# Patient Record
Sex: FEMALE | Race: BLACK OR AFRICAN AMERICAN
[De-identification: names, ages, dates, MRNs, and addresses within clinical notes are randomized per-mention and may not be internally consistent; named-entity substitution may affect disease eponyms.]

---

## 2018-08-21 ENCOUNTER — HOSPITAL ENCOUNTER (INPATIENT)
Dept: HOSPITAL 72 - EMR | Age: 28
LOS: 4 days | Discharge: HOME | DRG: 392 | End: 2018-08-25
Payer: SELF-PAY

## 2018-08-21 VITALS — WEIGHT: 129 LBS | HEIGHT: 61 IN | BODY MASS INDEX: 24.35 KG/M2

## 2018-08-21 VITALS — SYSTOLIC BLOOD PRESSURE: 147 MMHG | DIASTOLIC BLOOD PRESSURE: 105 MMHG

## 2018-08-21 VITALS — DIASTOLIC BLOOD PRESSURE: 89 MMHG | SYSTOLIC BLOOD PRESSURE: 133 MMHG

## 2018-08-21 VITALS — SYSTOLIC BLOOD PRESSURE: 142 MMHG | DIASTOLIC BLOOD PRESSURE: 80 MMHG

## 2018-08-21 DIAGNOSIS — K56.1: ICD-10-CM

## 2018-08-21 DIAGNOSIS — R79.89: ICD-10-CM

## 2018-08-21 DIAGNOSIS — E87.6: ICD-10-CM

## 2018-08-21 DIAGNOSIS — K58.9: ICD-10-CM

## 2018-08-21 DIAGNOSIS — R10.9: Primary | ICD-10-CM

## 2018-08-21 DIAGNOSIS — R74.0: ICD-10-CM

## 2018-08-21 DIAGNOSIS — K59.00: ICD-10-CM

## 2018-08-21 LAB
ADD MANUAL DIFF: NO
ALBUMIN SERPL-MCNC: 4 G/DL (ref 3.4–5)
ALBUMIN/GLOB SERPL: 0.8 {RATIO} (ref 1–2.7)
ALP SERPL-CCNC: 69 U/L (ref 46–116)
ALT SERPL-CCNC: 533 U/L (ref 12–78)
ANION GAP SERPL CALC-SCNC: 15 MMOL/L (ref 5–15)
APPEARANCE UR: (no result)
APTT PPP: (no result) S
AST SERPL-CCNC: 240 U/L (ref 15–37)
BASOPHILS NFR BLD AUTO: 0.8 % (ref 0–2)
BILIRUB DIRECT SERPL-MCNC: 0.3 MG/DL (ref 0–0.3)
BILIRUB SERPL-MCNC: 1.1 MG/DL (ref 0.2–1)
BUN SERPL-MCNC: 11 MG/DL (ref 7–18)
CALCIUM SERPL-MCNC: 9.2 MG/DL (ref 8.5–10.1)
CHLORIDE SERPL-SCNC: 95 MMOL/L (ref 98–107)
CO2 SERPL-SCNC: 23 MMOL/L (ref 21–32)
CREAT SERPL-MCNC: 0.8 MG/DL (ref 0.55–1.3)
EOSINOPHIL NFR BLD AUTO: 0.7 % (ref 0–3)
ERYTHROCYTE [DISTWIDTH] IN BLOOD BY AUTOMATED COUNT: 10.4 % (ref 11.6–14.8)
GLOBULIN SER-MCNC: 4.8 G/DL
GLUCOSE UR STRIP-MCNC: NEGATIVE MG/DL
HCT VFR BLD CALC: 45.3 % (ref 37–47)
HGB BLD-MCNC: 15.7 G/DL (ref 12–16)
KETONES UR QL STRIP: (no result)
LEUKOCYTE ESTERASE UR QL STRIP: (no result)
LYMPHOCYTES NFR BLD AUTO: 23.3 % (ref 20–45)
MCV RBC AUTO: 91 FL (ref 80–99)
MONOCYTES NFR BLD AUTO: 8.6 % (ref 1–10)
NEUTROPHILS NFR BLD AUTO: 66.6 % (ref 45–75)
NITRITE UR QL STRIP: NEGATIVE
PH UR STRIP: 6.5 [PH] (ref 4.5–8)
PLATELET # BLD: 198 K/UL (ref 150–450)
POTASSIUM SERPL-SCNC: 2.6 MMOL/L (ref 3.5–5.1)
PROT UR QL STRIP: (no result)
RBC # BLD AUTO: 4.97 M/UL (ref 4.2–5.4)
SODIUM SERPL-SCNC: 133 MMOL/L (ref 136–145)
SP GR UR STRIP: 1.01 (ref 1–1.03)
UROBILINOGEN UR-MCNC: 12 MG/DL (ref 0–1)
WBC # BLD AUTO: 12.2 K/UL (ref 4.8–10.8)

## 2018-08-21 PROCEDURE — 85025 COMPLETE CBC W/AUTO DIFF WBC: CPT

## 2018-08-21 PROCEDURE — 99285 EMERGENCY DEPT VISIT HI MDM: CPT

## 2018-08-21 PROCEDURE — 86709 HEPATITIS A IGM ANTIBODY: CPT

## 2018-08-21 PROCEDURE — 84100 ASSAY OF PHOSPHORUS: CPT

## 2018-08-21 PROCEDURE — 87340 HEPATITIS B SURFACE AG IA: CPT

## 2018-08-21 PROCEDURE — 84443 ASSAY THYROID STIM HORMONE: CPT

## 2018-08-21 PROCEDURE — 86140 C-REACTIVE PROTEIN: CPT

## 2018-08-21 PROCEDURE — 80053 COMPREHEN METABOLIC PANEL: CPT

## 2018-08-21 PROCEDURE — 85007 BL SMEAR W/DIFF WBC COUNT: CPT

## 2018-08-21 PROCEDURE — 74177 CT ABD & PELVIS W/CONTRAST: CPT

## 2018-08-21 PROCEDURE — 74018 RADEX ABDOMEN 1 VIEW: CPT

## 2018-08-21 PROCEDURE — 94640 AIRWAY INHALATION TREATMENT: CPT

## 2018-08-21 PROCEDURE — 84550 ASSAY OF BLOOD/URIC ACID: CPT

## 2018-08-21 PROCEDURE — 82550 ASSAY OF CK (CPK): CPT

## 2018-08-21 PROCEDURE — 94664 DEMO&/EVAL PT USE INHALER: CPT

## 2018-08-21 PROCEDURE — 80061 LIPID PANEL: CPT

## 2018-08-21 PROCEDURE — 84702 CHORIONIC GONADOTROPIN TEST: CPT

## 2018-08-21 PROCEDURE — 81003 URINALYSIS AUTO W/O SCOPE: CPT

## 2018-08-21 PROCEDURE — 36415 COLL VENOUS BLD VENIPUNCTURE: CPT

## 2018-08-21 PROCEDURE — 80048 BASIC METABOLIC PNL TOTAL CA: CPT

## 2018-08-21 PROCEDURE — 82977 ASSAY OF GGT: CPT

## 2018-08-21 PROCEDURE — 74250 X-RAY XM SM INT 1CNTRST STD: CPT

## 2018-08-21 PROCEDURE — 86705 HEP B CORE ANTIBODY IGM: CPT

## 2018-08-21 PROCEDURE — 86803 HEPATITIS C AB TEST: CPT

## 2018-08-21 PROCEDURE — 83690 ASSAY OF LIPASE: CPT

## 2018-08-21 PROCEDURE — 83735 ASSAY OF MAGNESIUM: CPT

## 2018-08-21 PROCEDURE — 82248 BILIRUBIN DIRECT: CPT

## 2018-08-21 PROCEDURE — 81025 URINE PREGNANCY TEST: CPT

## 2018-08-21 NOTE — EMERGENCY ROOM REPORT
History of Present Illness


General


Chief Complaint:  Abdominal Pain


Source:  Patient





Present Illness


HPI


28-year-old female history of cholecystectomy p/w abdominal pain and vomiting 

for 5 days. 


Patient states pain started gradually, localized to all over abdomen, non 

radiating, sharp in nature, intermittent. No relieving or exacerbating factors. 

Severity is 5 out of 10.


Pt reports n/v, multiple episodes of nbnb vomiting, denies diarrhea, and cannot 

room of the last time she passed gas or stool, probably in 5 days


Denies fever, chills. 


Denies any drug use


Allergies:  


Coded Allergies:  


     No Known Allergies (Unverified , 8/21/18)





Patient History


Past Medical History:  see triage record


Past Surgical History:  none


Pertinent Family History:  none


Last Menstrual Period:  7/14/18


Pregnant Now:  No


Reviewed Nursing Documentation:  PMH: Agreed; PSxH: Agreed





Nursing Documentation-PMH


Past Medical History:  No History, Except For





Review of Systems


All Other Systems:  negative except mentioned in HPI





Physical Exam





Vital Signs








  Date Time  Temp Pulse Resp B/P (MAP) Pulse Ox O2 Delivery O2 Flow Rate FiO2


 


8/21/18 17:26 98.7 116 18 131/69 95 Room Air  





 98.8       








Sp02 EP Interpretation:  reviewed, normal


General Appearance:  alert, GCS 15, non-toxic, moderate distress


Head:  normocephalic, atraumatic


Eyes:  bilateral eye normal inspection, bilateral eye PERRL, bilateral eye EOMI


ENT:  normal ENT inspection, normal pharynx, normal voice, moist mucus membranes


Neck:  normal inspection, full range of motion, supple


Respiratory:  normal inspection, lungs clear, normal breath sounds, no 

respiratory distress, no retraction, no wheezing, speaking full sentences, 

chest symmetrical


Cardiovascular #1:  normal inspection, regular rate, rhythm, no edema, normal 

capillary refill


Cardiovascular #2:  2+ radial (R), 2+ radial (L)


Gastrointestinal:  other - Nondistended, generalized tenderness, normal bowel 

sounds, no rebound no guarding


Musculoskeletal:  normal inspection, back normal, normal range of motion, non-

tender


Neurologic:  normal inspection, alert, oriented x3, responsive, motor strength/

tone normal, sensory intact, normal gait, speech normal


Psychiatric:  normal inspection, judgement/insight normal, memory normal


Skin:  normal inspection, normal color, no rash, warm/dry, well hydrated, 

normal turgor





Medical Decision Making


Diagnostic Impression:  


 Primary Impression:  


 Abdominal pain


 Additional Impressions:  


 Elevated liver enzymes


 Hypokalemia


 Intractable nausea and vomiting


ER Course


28-year-old female with abdominal pain also not passing gas or stool for 5 days





Differential Diagnosis:


Gastritis, gastroenteritis, appendicitis, diverticulitis, SBO, UTI/pyelo








Plan:


Basic labs, ua, pain control, IVF


CT abdopelvis








ER course:


Patient has remained stable during ED stay.


given potassium and fluid, still unable to tolerate PO


repeat abd exam has been benign


CT showing "transient" intussuception - I informed Dr La





Disposition:


Patient is to be admitted to Dr Sanchez





Please note that this Emergency Department Report was dictated using [employer] technology software, occasionally this can lead to 

erroneous entry secondary to interpretation by the dictation equipment





Laboratory Tests








Test


  8/21/18


17:13


 


White Blood Count


  12.2 K/UL


(4.8-10.8)  H


 


Red Blood Count


  4.97 M/UL


(4.20-5.40)


 


Hemoglobin


  15.7 G/DL


(12.0-16.0)


 


Hematocrit


  45.3 %


(37.0-47.0)


 


Mean Corpuscular Volume 91 FL (80-99)  


 


Mean Corpuscular Hemoglobin


  31.5 PG


(27.0-31.0)  H


 


Mean Corpuscular Hemoglobin


Concent 34.6 G/DL


(32.0-36.0)


 


Red Cell Distribution Width


  10.4 %


(11.6-14.8)  L


 


Platelet Count


  198 K/UL


(150-450)


 


Mean Platelet Volume


  8.7 FL


(6.5-10.1)


 


Neutrophils (%) (Auto)


  66.6 %


(45.0-75.0)


 


Lymphocytes (%) (Auto)


  23.3 %


(20.0-45.0)


 


Monocytes (%) (Auto)


  8.6 %


(1.0-10.0)


 


Eosinophils (%) (Auto)


  0.7 %


(0.0-3.0)


 


Basophils (%) (Auto)


  0.8 %


(0.0-2.0)


 


Urine Color Brown  


 


Urine Appearance


  Slightly


cloudy


 


Urine pH 6.5 (4.5-8.0)  


 


Urine Specific Gravity


  1.015


(1.005-1.035)


 


Urine Protein


  2+ (NEGATIVE)


H


 


Urine Glucose (UA)


  Negative


(NEGATIVE)


 


Urine Ketones


  3+ (NEGATIVE)


H


 


Urine Occult Blood


  1+ (NEGATIVE)


H


 


Urine Nitrite


  Negative


(NEGATIVE)


 


Urine Bilirubin


  1+ (NEGATIVE)


H


 


Urine Ictotest


  Negative


(NEGATIVE)


 


Urine Urobilinogen


  12 MG/DL


(0.0-1.0)  H


 


Urine Leukocyte Esterase


  2+ (NEGATIVE)


H


 


Urine RBC


  2-4 /HPF (0 -


2)  H


 


Urine WBC


  5-10 /HPF (0 -


2)  H


 


Urine Squamous Epithelial


Cells Few /LPF


(NONE/OCC)


 


Urine Amorphous Sediment


  Few /LPF


(NONE)  H


 


Urine Bacteria


  Few /HPF


(NONE)


 


Urine HCG, Qualitative


  Negative


(NEGATIVE)


 


Sodium Level


  133 MMOL/L


(136-145)  L


 


Potassium Level


  2.6 MMOL/L


(3.5-5.1)  *L


 


Chloride Level


  95 MMOL/L


()  L


 


Carbon Dioxide Level


  23 MMOL/L


(21-32)


 


Anion Gap


  15 mmol/L


(5-15)


 


Blood Urea Nitrogen


  11 mg/dL


(7-18)


 


Creatinine


  0.8 MG/DL


(0.55-1.30)


 


Estimate Glomerular


Filtration Rate > 60 mL/min


(>60)


 


Glucose Level


  104 MG/DL


()


 


Calcium Level


  9.2 MG/DL


(8.5-10.1)


 


Total Bilirubin


  1.1 MG/DL


(0.2-1.0)  H


 


Direct Bilirubin


  0.3 MG/DL


(0.0-0.3)


 


Aspartate Amino Transferase


(AST) 240 U/L


(15-37)  H


 


Alanine Aminotransferase (ALT)


  533 U/L


(12-78)  H


 


Alkaline Phosphatase


  69 U/L


()


 


Total Protein


  8.8 G/DL


(6.4-8.2)  H


 


Albumin


  4.0 G/DL


(3.4-5.0)


 


Globulin 4.8 g/dL  


 


Albumin/Globulin Ratio


  0.8 (1.0-2.7)


L


 


Lipase


  189 U/L


()


 


Human Chorionic Gonadotropin,


Quant < 1 mIU/mL


(1-6)  L








CT/MRI/US Diagnostic Results


CT/MRI/US Diagnostic Results :  


   Imaging Test Ordered:  CT ABDO PELVIS


   Impression


CT ABDOMEN & PELVIS With Contrast:





Lower thorax is unremarkable.





Gallbladder is surgically absent.





Liver, spleen, pancreas and adrenal glands are unremarkable.





Kidneys, ureters and urinary bladder are unremarkable.





Uterus and adnexa are unremarkable.





Appendix is unremarkable.





Transient small bowel intussusception within the left lower quadrant.





No bowel obstruction.





No acute osseous abnormality.





Last Vital Signs








  Date Time  Temp Pulse Resp B/P (MAP) Pulse Ox O2 Delivery O2 Flow Rate FiO2


 


8/21/18 17:26 98.7 116 18 131/69 95 Room Air  





 98.8       








Disposition:  ADMITTED AS INPATIENT


Condition:  Serious











Natalie Yost M.D. Aug 21, 2018 18:23

## 2018-08-22 VITALS — SYSTOLIC BLOOD PRESSURE: 146 MMHG | DIASTOLIC BLOOD PRESSURE: 97 MMHG

## 2018-08-22 VITALS — DIASTOLIC BLOOD PRESSURE: 105 MMHG | SYSTOLIC BLOOD PRESSURE: 148 MMHG

## 2018-08-22 VITALS — SYSTOLIC BLOOD PRESSURE: 160 MMHG | DIASTOLIC BLOOD PRESSURE: 86 MMHG

## 2018-08-22 VITALS — SYSTOLIC BLOOD PRESSURE: 142 MMHG | DIASTOLIC BLOOD PRESSURE: 92 MMHG

## 2018-08-22 VITALS — DIASTOLIC BLOOD PRESSURE: 86 MMHG | SYSTOLIC BLOOD PRESSURE: 133 MMHG

## 2018-08-22 VITALS — SYSTOLIC BLOOD PRESSURE: 136 MMHG | DIASTOLIC BLOOD PRESSURE: 93 MMHG

## 2018-08-22 LAB
ADD MANUAL DIFF: NO
ALBUMIN SERPL-MCNC: 3.3 G/DL (ref 3.4–5)
ALBUMIN/GLOB SERPL: 0.8 {RATIO} (ref 1–2.7)
ALP SERPL-CCNC: 53 U/L (ref 46–116)
ALT SERPL-CCNC: 471 U/L (ref 12–78)
ANION GAP SERPL CALC-SCNC: 10 MMOL/L (ref 5–15)
AST SERPL-CCNC: 190 U/L (ref 15–37)
BASOPHILS NFR BLD AUTO: 0.9 % (ref 0–2)
BILIRUB SERPL-MCNC: 0.8 MG/DL (ref 0.2–1)
BUN SERPL-MCNC: 6 MG/DL (ref 7–18)
CALCIUM SERPL-MCNC: 7.9 MG/DL (ref 8.5–10.1)
CHLORIDE SERPL-SCNC: 102 MMOL/L (ref 98–107)
CHOLEST SERPL-MCNC: 95 MG/DL (ref ?–200)
CK SERPL-CCNC: 569 U/L (ref 26–308)
CO2 SERPL-SCNC: 22 MMOL/L (ref 21–32)
CREAT SERPL-MCNC: 0.6 MG/DL (ref 0.55–1.3)
EOSINOPHIL NFR BLD AUTO: 1.6 % (ref 0–3)
ERYTHROCYTE [DISTWIDTH] IN BLOOD BY AUTOMATED COUNT: 10.5 % (ref 11.6–14.8)
GAMMA GLUTAMYL TRANSPEPTIDASE: 59 U/L (ref 5–85)
GLOBULIN SER-MCNC: 3.9 G/DL
HCT VFR BLD CALC: 39 % (ref 37–47)
HDLC SERPL-MCNC: 42 MG/DL (ref 40–60)
HGB BLD-MCNC: 13.6 G/DL (ref 12–16)
LYMPHOCYTES NFR BLD AUTO: 23.9 % (ref 20–45)
MCV RBC AUTO: 91 FL (ref 80–99)
MONOCYTES NFR BLD AUTO: 10.9 % (ref 1–10)
NEUTROPHILS NFR BLD AUTO: 62.7 % (ref 45–75)
PHOSPHATE SERPL-MCNC: 2.2 MG/DL (ref 2.5–4.9)
PLATELET # BLD: 187 K/UL (ref 150–450)
POTASSIUM SERPL-SCNC: 3.8 MMOL/L (ref 3.5–5.1)
RBC # BLD AUTO: 4.3 M/UL (ref 4.2–5.4)
SODIUM SERPL-SCNC: 134 MMOL/L (ref 136–145)
TRIGL SERPL-MCNC: 56 MG/DL (ref 30–150)
WBC # BLD AUTO: 9.9 K/UL (ref 4.8–10.8)

## 2018-08-22 RX ADMIN — TRAMADOL HYDROCHLORIDE PRN MG: 50 TABLET, FILM COATED ORAL at 12:11

## 2018-08-22 RX ADMIN — TRAMADOL HYDROCHLORIDE PRN MG: 50 TABLET, FILM COATED ORAL at 21:16

## 2018-08-22 RX ADMIN — IPRATROPIUM BROMIDE AND ALBUTEROL SULFATE PRN ML: .5; 3 SOLUTION RESPIRATORY (INHALATION) at 21:34

## 2018-08-22 RX ADMIN — IPRATROPIUM BROMIDE AND ALBUTEROL SULFATE PRN ML: .5; 3 SOLUTION RESPIRATORY (INHALATION) at 10:38

## 2018-08-22 NOTE — CONSULTATION
History of Present Illness


General


Date patient seen:  Aug 22, 2018


Time patient seen:  08:00 - am


Chief Complaint:  Abdominal Pain


Referring physician:  duane


Reason for Consultation:  pain management





Present Illness


HPI


28-year-old female history of cholecystectomy p/w abdominal pain and vomiting 

for 5 days. 


Patient states pain started gradually, localized to all over abdomen, non 

radiating, sharp in nature, intermittent. No relieving or exacerbating factors. 

Severity is 5 out of 10.


Pt reports n/v, multiple episodes of vomiting, denies diarrhea.


We were consulted for pain management.


Allergies:  


Coded Allergies:  


     No Known Allergies (Unverified , 8/21/18)





Patient History


Healthcare decision maker


N


Resuscitation status


Full Code


Advanced Directive on File


No





Past Medical/Surgical History


Past Medical/Surgical History:  


(1) Abdominal pain


(2) Hypokalemia





Review of Systems


Constitutional:  Reports: no symptoms


Eye:  Reports: no symptoms


ENT:  Reports: no symptoms


Respiratory:  Reports: no symptoms


Cardiovascular:  Reports: no symptoms


Gastrointestinal:  Reports: abdominal pain, nausea, vomiting


Genitourinary:  Reports: no symptoms


Musculoskeletal:  Reports: no symptoms


Skin:  Reports: no symptoms


Psychiatric:  Reports: no symptoms


Neurological:  Reports: no symptoms


Endocrine:  Reports: no symptoms


Hematologic/Lymphatic:  Reports: no symptoms





Physical Exam


General Appearance:  no apparent distress, alert


HEENT:  PERRL, EOMI


Neck:  normal alignment, supple


Respiratory/Chest:  lungs clear, normal breath sounds


Cardiovascular/Chest:  normal rate, regular rhythm


Abdomen:  tender


Extremities:  non-tender


Neurologic:  CNs II-XII grossly normal, alert, oriented x 3





Last 24 Hour Vital Signs








  Date Time  Temp Pulse Resp B/P (MAP) Pulse Ox O2 Delivery O2 Flow Rate FiO2


 


8/22/18 04:44 97.7 73 18 142/92 (109) 98   





 97.7       


 


8/22/18 00:00 98.2 60 16 133/86 (102) 98   





 98.2       


 


8/21/18 22:49 96.4 74 16 142/80 (100) 93   





 96.4       


 


8/21/18 22:19      Room Air  


 


8/21/18 21:45 98.4 78 18 133/89 100 Room Air  





 98.4       


 


8/21/18 20:00 98.4 78 18 133/89 100 Room Air  





 98.4       


 


8/21/18 18:39 98.8 73 18 147/105 99 Room Air  





 98.8       


 


8/21/18 17:26 98.7 116 18 131/69 95 Room Air  





 98.8       

















Intake and Output  


 


 8/21/18 8/22/18





 19:00 07:00


 


Intake Total  1000 ml


 


Balance  1000 ml


 


  


 


Intake IV Total  1000 ml


 


# Voids 1 2











Laboratory Tests








Test


  8/21/18


17:13 8/21/18


21:10 8/22/18


05:10


 


White Blood Count


  12.2 K/UL


(4.8-10.8)  H 


  9.9 K/UL


(4.8-10.8)


 


Red Blood Count


  4.97 M/UL


(4.20-5.40) 


  4.30 M/UL


(4.20-5.40)


 


Hemoglobin


  15.7 G/DL


(12.0-16.0) 


  13.6 G/DL


(12.0-16.0)


 


Hematocrit


  45.3 %


(37.0-47.0) 


  39.0 %


(37.0-47.0)


 


Mean Corpuscular Volume 91 FL (80-99)    91 FL (80-99)  


 


Mean Corpuscular Hemoglobin


  31.5 PG


(27.0-31.0)  H 


  31.8 PG


(27.0-31.0)  H


 


Mean Corpuscular Hemoglobin


Concent 34.6 G/DL


(32.0-36.0) 


  35.0 G/DL


(32.0-36.0)


 


Red Cell Distribution Width


  10.4 %


(11.6-14.8)  L 


  10.5 %


(11.6-14.8)  L


 


Platelet Count


  198 K/UL


(150-450) 


  187 K/UL


(150-450)


 


Mean Platelet Volume


  8.7 FL


(6.5-10.1) 


  8.6 FL


(6.5-10.1)


 


Neutrophils (%) (Auto)


  66.6 %


(45.0-75.0) 


  62.7 %


(45.0-75.0)


 


Lymphocytes (%) (Auto)


  23.3 %


(20.0-45.0) 


  23.9 %


(20.0-45.0)


 


Monocytes (%) (Auto)


  8.6 %


(1.0-10.0) 


  10.9 %


(1.0-10.0)  H


 


Eosinophils (%) (Auto)


  0.7 %


(0.0-3.0) 


  1.6 %


(0.0-3.0)


 


Basophils (%) (Auto)


  0.8 %


(0.0-2.0) 


  0.9 %


(0.0-2.0)


 


Urine Color Brown    


 


Urine Appearance


  Slightly


cloudy 


  


 


 


Urine pH 6.5 (4.5-8.0)    


 


Urine Specific Gravity


  1.015


(1.005-1.035) 


  


 


 


Urine Protein


  2+ (NEGATIVE)


H 


  


 


 


Urine Glucose (UA)


  Negative


(NEGATIVE) 


  


 


 


Urine Ketones


  3+ (NEGATIVE)


H 


  


 


 


Urine Occult Blood


  1+ (NEGATIVE)


H 


  


 


 


Urine Nitrite


  Negative


(NEGATIVE) 


  


 


 


Urine Bilirubin


  1+ (NEGATIVE)


H 


  


 


 


Urine Ictotest


  Negative


(NEGATIVE) 


  


 


 


Urine Urobilinogen


  12 MG/DL


(0.0-1.0)  H 


  


 


 


Urine Leukocyte Esterase


  2+ (NEGATIVE)


H 


  


 


 


Urine RBC


  2-4 /HPF (0 -


2)  H 


  


 


 


Urine WBC


  5-10 /HPF (0 -


2)  H 


  


 


 


Urine Squamous Epithelial


Cells Few /LPF


(NONE/OCC) 


  


 


 


Urine Amorphous Sediment


  Few /LPF


(NONE)  H 


  


 


 


Urine Bacteria


  Few /HPF


(NONE) 


  


 


 


Urine HCG, Qualitative


  Negative


(NEGATIVE) 


  


 


 


Sodium Level


  133 MMOL/L


(136-145)  L 


  134 MMOL/L


(136-145)  L


 


Potassium Level


  2.6 MMOL/L


(3.5-5.1)  *L 


  3.8 MMOL/L


(3.5-5.1)


 


Chloride Level


  95 MMOL/L


()  L 


  102 MMOL/L


()


 


Carbon Dioxide Level


  23 MMOL/L


(21-32) 


  22 MMOL/L


(21-32)


 


Anion Gap


  15 mmol/L


(5-15) 


  10 mmol/L


(5-15)


 


Blood Urea Nitrogen


  11 mg/dL


(7-18) 


  6 mg/dL (7-18)


L


 


Creatinine


  0.8 MG/DL


(0.55-1.30) 


  0.6 MG/DL


(0.55-1.30)


 


Estimat Glomerular Filtration


Rate > 60 mL/min


(>60) 


  > 60 mL/min


(>60)


 


Glucose Level


  104 MG/DL


() 


  106 MG/DL


()


 


Calcium Level


  9.2 MG/DL


(8.5-10.1) 


  7.9 MG/DL


(8.5-10.1)  L


 


Total Bilirubin


  1.1 MG/DL


(0.2-1.0)  H 


  0.8 MG/DL


(0.2-1.0)


 


Direct Bilirubin


  0.3 MG/DL


(0.0-0.3) 


  


 


 


Aspartate Amino Transf


(AST/SGOT) 240 U/L


(15-37)  H 


  190 U/L


(15-37)  H


 


Alanine Aminotransferase


(ALT/SGPT) 533 U/L


(12-78)  H 


  471 U/L


(12-78)  H


 


Alkaline Phosphatase


  69 U/L


() 


  53 U/L


()


 


C-Reactive Protein,


Quantitative < 0.4 mg/dL


(0.00-0.90) 


  


 


 


Total Protein


  8.8 G/DL


(6.4-8.2)  H 


  7.2 G/DL


(6.4-8.2)


 


Albumin


  4.0 G/DL


(3.4-5.0) 


  3.3 G/DL


(3.4-5.0)  L


 


Globulin 4.8 g/dL    3.9 g/dL  


 


Albumin/Globulin Ratio


  0.8 (1.0-2.7)


L 


  0.8 (1.0-2.7)


L


 


Lipase


  189 U/L


() 


  


 


 


Human Chorionic Gonadotropin,


Quant < 1 mIU/mL


(1-6)  L 


  


 


 


Hepatitis A IgM Antibody  Pending   


 


Hepatitis B Surface Antigen  Pending   


 


Hepatitis B Core IgM Antibody  Pending   


 


Hepatitis C Antibody  Pending   


 


Uric Acid


  


  


  3.1 MG/DL


(2.6-7.2)


 


Phosphorus Level


  


  


  2.2 MG/DL


(2.5-4.9)  L


 


Magnesium Level


  


  


  1.9 MG/DL


(1.8-2.4)


 


Gamma Glutamyl Transpeptidase   59 U/L (5-85)  


 


Total Creatine Kinase


  


  


  569 U/L


()  H


 


Triglycerides Level


  


  


  56 MG/DL


()


 


Cholesterol Level


  


  


  95 MG/DL (<


200)


 


LDL Cholesterol


  


  


  45 mg/dL


(<100)


 


HDL Cholesterol


  


  


  42 MG/DL


(40-60)


 


Cholesterol/HDL Ratio


  


  


  2.3 (3.3-4.4)


L


 


Thyroid Stimulating Hormone


(TSH) 


  


  0.728 uiU/mL


(0.358-3.740)








Height (Feet):  5


Height (Inches):  1.00


Weight (Pounds):  129


Medications





Current Medications








 Medications


  (Trade)  Dose


 Ordered  Sig/Marlin


 Route


 PRN Reason  Start Time


 Stop Time Status Last Admin


Dose Admin


 


 Dextrose/


 Electrolytes  1,000 ml @ 


 75 mls/hr  [id number]


 IV


   8/21/18 22:30


 9/20/18 22:29  8/22/18 02:20


 


 


 Famotidine


  (Pepcid I.v.)  20 mg  Q12HR


 IVP


   8/22/18 09:00


 9/21/18 08:59   


 


 


 Ondansetron HCl


  (Zofran)  4 mg  Q6H  PRN


 IVP


 Nausea & Vomiting  8/21/18 23:15


 9/20/18 23:14   


 











Assessment/Plan


Problem List:  


(1) Abdominal pain


ICD Codes:  R10.9 - Unspecified abdominal pain


SNOMED:  05572888


(2) Intussusception of small bowel


ICD Codes:  K56.1 - Intussusception


SNOMED:  052693030


Assessment/Plan


Patient will be started on Tramadol 25mg PO 1 tab Q4H PRN severe pain. 


D/w Dr. Gunderson and he concurred. 


Than you for consultation.











Chucho Mora Aug 22, 2018 07:58

## 2018-08-22 NOTE — GI INITIAL CONSULT NOTE
History of Present Illness


General


Date patient seen:  Aug 22, 2018


Time patient seen:  14:15


Reason for Hospitalization:  Abdominal Pain


Referring physician:  MADDY HOLDEN


Reason for Consultation:  ABDOMINAL PAIN





Present Illness


HPI


28-year-old female history of cholecystectomy p/w abdominal pain and vomiting 

for 5 days. 


Patient states pain started gradually, localized to all over abdomen, non 

radiating, sharp in nature, intermittent. No relieving or exacerbating factors. 

Severity is 5 out of 10.


Pt reports n/v, multiple episodes of nbnb vomiting, denies diarrhea, and cannot 

room of the last time she passed gas or stool, probably in 5 days


Denies fever, chills. 


Denies any drug use


GI consulted for abdominal pain.  Pt seen, awake A&Ox4 NAD with no active s/sx 

of N/V/D.  Abdomen is soft, non distended.  Per patient no BM in 5 days.  Has 

been passing gas.  States she is unable to tolerate PO, and has vomited 

multiple times.  CT AP shows patient has intussusception of the small bowel.  

No history of endoscopy / colonoscopy.


Med list reviewed/reconciled:  Yes


Allergies:  


Coded Allergies:  


     No Known Allergies (Unverified , 8/21/18)





Patient History


History Provided By:  Patient, Medical Record


PMH Narrative


Past Medical History:  see triage record


Past Surgical History:  none


Pertinent Family History:  none


Last Menstrual Period:  7/14/18


Pregnant Now:  No


Reviewed Nursing Documentation:  PMH: Agreed; PSxH: Agreed





Nursing Documentation-PM


Past Medical History:  No History, Except For


Social History:  Denies: smoking, alcohol use, drug use, other





Review of Systems


All Other Systems:  negative except mentioned in HPI





Physical Exam





Vital Signs








  Date Time  Temp Pulse Resp B/P (MAP) Pulse Ox O2 Delivery O2 Flow Rate FiO2


 


8/21/18 17:26 98.7 116 18 131/69 95 Room Air  





 98.8       


 


8/22/18 10:39        21








Sp02 EP Interpretation:  reviewed, normal


Labs





Laboratory Tests








Test


  8/21/18


17:13 8/21/18


21:10 8/22/18


05:10


 


White Blood Count


  12.2 K/UL


(4.8-10.8)  H 


  9.9 K/UL


(4.8-10.8)


 


Red Blood Count


  4.97 M/UL


(4.20-5.40) 


  4.30 M/UL


(4.20-5.40)


 


Hemoglobin


  15.7 G/DL


(12.0-16.0) 


  13.6 G/DL


(12.0-16.0)


 


Hematocrit


  45.3 %


(37.0-47.0) 


  39.0 %


(37.0-47.0)


 


Mean Corpuscular Volume 91 FL (80-99)    91 FL (80-99)  


 


Mean Corpuscular Hemoglobin


  31.5 PG


(27.0-31.0)  H 


  31.8 PG


(27.0-31.0)  H


 


Mean Corpuscular Hemoglobin


Concent 34.6 G/DL


(32.0-36.0) 


  35.0 G/DL


(32.0-36.0)


 


Red Cell Distribution Width


  10.4 %


(11.6-14.8)  L 


  10.5 %


(11.6-14.8)  L


 


Platelet Count


  198 K/UL


(150-450) 


  187 K/UL


(150-450)


 


Mean Platelet Volume


  8.7 FL


(6.5-10.1) 


  8.6 FL


(6.5-10.1)


 


Neutrophils (%) (Auto)


  66.6 %


(45.0-75.0) 


  62.7 %


(45.0-75.0)


 


Lymphocytes (%) (Auto)


  23.3 %


(20.0-45.0) 


  23.9 %


(20.0-45.0)


 


Monocytes (%) (Auto)


  8.6 %


(1.0-10.0) 


  10.9 %


(1.0-10.0)  H


 


Eosinophils (%) (Auto)


  0.7 %


(0.0-3.0) 


  1.6 %


(0.0-3.0)


 


Basophils (%) (Auto)


  0.8 %


(0.0-2.0) 


  0.9 %


(0.0-2.0)


 


Urine Color Brown    


 


Urine Appearance


  Slightly


cloudy 


  


 


 


Urine pH 6.5 (4.5-8.0)    


 


Urine Specific Gravity


  1.015


(1.005-1.035) 


  


 


 


Urine Protein


  2+ (NEGATIVE)


H 


  


 


 


Urine Glucose (UA)


  Negative


(NEGATIVE) 


  


 


 


Urine Ketones


  3+ (NEGATIVE)


H 


  


 


 


Urine Occult Blood


  1+ (NEGATIVE)


H 


  


 


 


Urine Nitrite


  Negative


(NEGATIVE) 


  


 


 


Urine Bilirubin


  1+ (NEGATIVE)


H 


  


 


 


Urine Ictotest


  Negative


(NEGATIVE) 


  


 


 


Urine Urobilinogen


  12 MG/DL


(0.0-1.0)  H 


  


 


 


Urine Leukocyte Esterase


  2+ (NEGATIVE)


H 


  


 


 


Urine RBC


  2-4 /HPF (0 -


2)  H 


  


 


 


Urine WBC


  5-10 /HPF (0 -


2)  H 


  


 


 


Urine Squamous Epithelial


Cells Few /LPF


(NONE/OCC) 


  


 


 


Urine Amorphous Sediment


  Few /LPF


(NONE)  H 


  


 


 


Urine Bacteria


  Few /HPF


(NONE) 


  


 


 


Urine HCG, Qualitative


  Negative


(NEGATIVE) 


  


 


 


Sodium Level


  133 MMOL/L


(136-145)  L 


  134 MMOL/L


(136-145)  L


 


Potassium Level


  2.6 MMOL/L


(3.5-5.1)  *L 


  3.8 MMOL/L


(3.5-5.1)


 


Chloride Level


  95 MMOL/L


()  L 


  102 MMOL/L


()


 


Carbon Dioxide Level


  23 MMOL/L


(21-32) 


  22 MMOL/L


(21-32)


 


Anion Gap


  15 mmol/L


(5-15) 


  10 mmol/L


(5-15)


 


Blood Urea Nitrogen


  11 mg/dL


(7-18) 


  6 mg/dL (7-18)


L


 


Creatinine


  0.8 MG/DL


(0.55-1.30) 


  0.6 MG/DL


(0.55-1.30)


 


Estimat Glomerular Filtration


Rate > 60 mL/min


(>60) 


  > 60 mL/min


(>60)


 


Glucose Level


  104 MG/DL


() 


  106 MG/DL


()


 


Calcium Level


  9.2 MG/DL


(8.5-10.1) 


  7.9 MG/DL


(8.5-10.1)  L


 


Total Bilirubin


  1.1 MG/DL


(0.2-1.0)  H 


  0.8 MG/DL


(0.2-1.0)


 


Direct Bilirubin


  0.3 MG/DL


(0.0-0.3) 


  


 


 


Aspartate Amino Transf


(AST/SGOT) 240 U/L


(15-37)  H 


  190 U/L


(15-37)  H


 


Alanine Aminotransferase


(ALT/SGPT) 533 U/L


(12-78)  H 


  471 U/L


(12-78)  H


 


Alkaline Phosphatase


  69 U/L


() 


  53 U/L


()


 


C-Reactive Protein,


Quantitative < 0.4 mg/dL


(0.00-0.90) 


  


 


 


Total Protein


  8.8 G/DL


(6.4-8.2)  H 


  7.2 G/DL


(6.4-8.2)


 


Albumin


  4.0 G/DL


(3.4-5.0) 


  3.3 G/DL


(3.4-5.0)  L


 


Globulin 4.8 g/dL    3.9 g/dL  


 


Albumin/Globulin Ratio


  0.8 (1.0-2.7)


L 


  0.8 (1.0-2.7)


L


 


Lipase


  189 U/L


() 


  


 


 


Human Chorionic Gonadotropin,


Quant < 1 mIU/mL


(1-6)  L 


  


 


 


Hepatitis A IgM Antibody  Pending   


 


Hepatitis B Surface Antigen  Pending   


 


Hepatitis B Core IgM Antibody  Pending   


 


Hepatitis C Antibody  Pending   


 


Uric Acid


  


  


  3.1 MG/DL


(2.6-7.2)


 


Phosphorus Level


  


  


  2.2 MG/DL


(2.5-4.9)  L


 


Magnesium Level


  


  


  1.9 MG/DL


(1.8-2.4)


 


Gamma Glutamyl Transpeptidase   59 U/L (5-85)  


 


Total Creatine Kinase


  


  


  569 U/L


()  H


 


Triglycerides Level


  


  


  56 MG/DL


()


 


Cholesterol Level


  


  


  95 MG/DL (<


200)


 


LDL Cholesterol


  


  


  45 mg/dL


(<100)


 


HDL Cholesterol


  


  


  42 MG/DL


(40-60)


 


Cholesterol/HDL Ratio


  


  


  2.3 (3.3-4.4)


L


 


Thyroid Stimulating Hormone


(TSH) 


  


  0.728 uiU/mL


(0.358-3.740)








General Appearance:  well appearing, no apparent distress, alert, thin


Head:  normocephalic


EENT:  PERRL/EOMI, normal ENT inspection


Neck:  supple


Respiratory:  normal breath sounds, no respiratory distress


Cardiovascular:  normal rate


Gastrointestinal:  normal inspection, non tender, soft, normal bowel sounds, non

-distended


Rectal:  deferred


Genitourinary:  no CVA tenderness


Musculoskeletal:  normal inspection, back normal


Neurologic:  normal inspection, alert, oriented x3, responsive


Psychiatric:  normal inspection, judgement/insight normal, memory normal


Skin:  normal inspection, normal color, no rash, warm/dry, palpation normal, 

well hydrated


Lymphatic:  normal inspection, no adenopathy


Current Medications





Current Medications








 Medications


  (Trade)  Dose


 Ordered  Sig/Marlin


 Route


 PRN Reason  Start Time


 Stop Time Status Last Admin


Dose Admin


 


 Albuterol/


 Ipratropium


  (Albuterol/


 Ipratropium)  3 ml  Q4H  PRN


 HHN


 Shortness of Breath  8/22/18 08:15


 8/27/18 08:14  8/22/18 10:38


 


 


 Dextrose/


 Electrolytes  1,000 ml @ 


 75 mls/hr  [id number]


 IV


   8/21/18 22:30


 9/20/18 22:29  8/22/18 12:02


 


 


 Diatrizoate


 Meglum/


 Diatrizoate Sod


  (Gastrografin)  1 ml  NOW  PRN


 ORAL


 Radiology Procedure  8/22/18 10:00


 8/25/18 09:53   


 


 


 Diphenhydramine


 HCl


  (Benadryl)  25 mg  Q6H  PRN


 ORAL


 Itching/sleep  8/22/18 08:30


 9/21/18 08:14   


 


 


 Famotidine


  (Pepcid I.v.)  20 mg  Q12HR


 IVP


   8/22/18 09:00


 9/21/18 08:59  8/22/18 08:45


 


 


 Ondansetron HCl


  (Zofran)  4 mg  Q6H  PRN


 IVP


 Nausea & Vomiting  8/21/18 23:15


 9/20/18 23:14   


 


 


 Sodium Phosphate


 30 mm/Sodium


 Chloride  285 ml @ 


 47.5 mls/hr  ONCE  ONCE


 IVPB


   8/22/18 10:30


 8/22/18 16:29  8/22/18 11:21


 


 


 Tramadol HCl


  (Ultram)  25 mg  Q4HR  PRN


 ORAL


 Moderate Pain (Pain Scale 4-6)  8/22/18 12:15


 8/29/18 08:14  8/22/18 12:11


 











GI: Plan


Problems:  


(1) Intussusception of small bowel


(2) Abdominal pain


(3) Intractable nausea and vomiting


Plan


fu surgical recs


fu UGI Series, SBFT r/o obstruction


diet per surgery


pain mgmt


zofran prn


fu labs





Discussed with Dr. Cannon.


Thank you for this patient referral, we will follow.





The patient was seen and examined at bedside and all new and available data was 

reviewed in the patients chart. I agree with the above findings, impression 

and plan.  (Patient seen earlier today. Signature stamp does not reflect 

patient encounter time.). - MD Monique Ngo,Nathalia-Aj NP Aug 22, 2018 12:26

## 2018-08-22 NOTE — DIAGNOSTIC IMAGING REPORT
Clinical Indication: Abdominal pain, nausea, vomiting for 5 days

 

Technique:   Patient given oral contrast.  IV administration nonionic contrast.

Venous phase spiral acquisition obtained through the abdomen and pelvis. Multiplanar

reconstructions were generated. Total dose length product 563.08 mGycm. CTDIvol(s)

11.66 mGy. Dose reduction achieved using automated exposure control

 

 

Comparison: none

 

Findings: There is a small focal intussusception seen in the left lower quadrant,

probably involving proximal ileum, extending about 2 cm in length. The

intussuscipiens is actually somewhat greater in caliber than the intussusceptum. No

definite mass is demonstrated. Contrast is seen throughout most of the small bowel,

including beyond the point of intussusception. There is no small bowel wall

thickening.

 

The appendix is normal. No evidence of diverticulosis or diverticulitis. No free or

loculated intraperitoneal air or fluid is evident. The distal esophagus, stomach,

duodenum are unremarkable.

 

The gallbladder is surgically absent. The liver, bile ducts, pancreas, spleen,

adrenals, kidneys are unremarkable. No retroperitoneal or mesenteric mass or

adenopathy. No pelvic mass or adenopathy. Uterus and ovaries appear unremarkable.

 

Included lung bases are clear. 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

 

Impression: Small focal intussusception left lower quadrant, probably involving

proximal ileum, does not appear to obstructive, likely transient

 

No acute process otherwise

 

Surgically absent gallbladder

 

This agrees with the preliminary interpretation provided overnight by [facility]

teleradiology service.

 

 

 

The CT scanner at [facility] is accredited by the [employer] College of

Radiology and the scans are performed using protocols designed to limit radiation

exposure to as low as reasonably achievable to attain images of sufficient resolution

adequate for diagnostic evaluation.

## 2018-08-22 NOTE — CONSULTATION
History of Present Illness


General


Date patient seen:  Aug 22, 2018


Chief Complaint:  Abdominal Pain





Present Illness


HPI


28F otherwise well with history of lap anna in past presented to ED with 

complaints of abdominal pain x 5 days. states 1-2 weeks ago began a new 

birthcontrol nuvaring.  since has not been feeling well and 5 days ago began to 

have lower abdominal discomfort with associated nausea and emesis.  pain 

cramping intermittent discomfort. no radiation.  not improved so came to ED for 

evaluate.  no flatus in 2 days.  no BM in 1 week.  surgery called to evaluate 

for abdominal pain.  currently no n/v/f/c.  states pain improved with 

narcotics.  CT reviewed.  labs reviewed.


Allergies:  


Coded Allergies:  


     No Known Allergies (Unverified , 8/21/18)





Patient History


History Provided By:  Patient, Medical Record, PMD


Healthcare decision maker


N


Resuscitation status


Full Code


Advanced Directive on File


No





Past Medical/Surgical History


Past Medical/Surgical History:  


(1) Elevated liver enzymes


(2) Intractable nausea and vomiting


(3) Hypokalemia


(4) Abdominal pain





Review of Systems


All Other Systems:  negative except mentioned in HPI





Physical Exam


General Appearance:  no apparent distress, alert


Lines, tubes and drains:  peripheral


HEENT:  normocephalic, atraumatic, mucous membranes moist, PERRL


Neck:  normal inspection


Respiratory/Chest:  normal breath sounds, no respiratory distress, no accessory 

muscle use


Cardiovascular/Chest:  normal peripheral pulses, normal rate, regular rhythm


Abdomen:  normal bowel sounds, non tender, soft, no organomegaly, no mass


Extremities:  non-tender, normal inspection


Skin Exam:  normal pigmentation


Neurologic:  alert, oriented x 3





Last 24 Hour Vital Signs








  Date Time  Temp Pulse Resp B/P (MAP) Pulse Ox O2 Delivery O2 Flow Rate FiO2


 


8/22/18 10:47  68 18  100 Room Air  21


 


8/22/18 10:39  66 18  99 Room Air  21


 


8/22/18 09:00      Room Air  


 


8/22/18 08:00 97.3 63 19 136/93 (107) 99   





 97.3       


 


8/22/18 04:44 97.7 73 18 142/92 (109) 98   





 97.7       


 


8/22/18 00:00 98.2 60 16 133/86 (102) 98   





 98.2       


 


8/21/18 22:49 96.4 74 16 142/80 (100) 93   





 96.4       


 


8/21/18 22:19      Room Air  


 


8/21/18 21:45 98.4 78 18 133/89 100 Room Air  





 98.4       


 


8/21/18 20:00 98.4 78 18 133/89 100 Room Air  





 98.4       


 


8/21/18 18:39 98.8 73 18 147/105 99 Room Air  





 98.8       


 


8/21/18 17:26 98.7 116 18 131/69 95 Room Air  





 98.8       

















Intake and Output  


 


 8/21/18 8/22/18





 19:00 07:00


 


Intake Total  1000 ml


 


Balance  1000 ml


 


  


 


Intake IV Total  1000 ml


 


# Voids 1 2











Laboratory Tests








Test


  8/21/18


17:13 8/21/18


21:10 8/22/18


05:10


 


White Blood Count


  12.2 K/UL


(4.8-10.8)  H 


  9.9 K/UL


(4.8-10.8)


 


Red Blood Count


  4.97 M/UL


(4.20-5.40) 


  4.30 M/UL


(4.20-5.40)


 


Hemoglobin


  15.7 G/DL


(12.0-16.0) 


  13.6 G/DL


(12.0-16.0)


 


Hematocrit


  45.3 %


(37.0-47.0) 


  39.0 %


(37.0-47.0)


 


Mean Corpuscular Volume 91 FL (80-99)    91 FL (80-99)  


 


Mean Corpuscular Hemoglobin


  31.5 PG


(27.0-31.0)  H 


  31.8 PG


(27.0-31.0)  H


 


Mean Corpuscular Hemoglobin


Concent 34.6 G/DL


(32.0-36.0) 


  35.0 G/DL


(32.0-36.0)


 


Red Cell Distribution Width


  10.4 %


(11.6-14.8)  L 


  10.5 %


(11.6-14.8)  L


 


Platelet Count


  198 K/UL


(150-450) 


  187 K/UL


(150-450)


 


Mean Platelet Volume


  8.7 FL


(6.5-10.1) 


  8.6 FL


(6.5-10.1)


 


Neutrophils (%) (Auto)


  66.6 %


(45.0-75.0) 


  62.7 %


(45.0-75.0)


 


Lymphocytes (%) (Auto)


  23.3 %


(20.0-45.0) 


  23.9 %


(20.0-45.0)


 


Monocytes (%) (Auto)


  8.6 %


(1.0-10.0) 


  10.9 %


(1.0-10.0)  H


 


Eosinophils (%) (Auto)


  0.7 %


(0.0-3.0) 


  1.6 %


(0.0-3.0)


 


Basophils (%) (Auto)


  0.8 %


(0.0-2.0) 


  0.9 %


(0.0-2.0)


 


Urine Color Brown    


 


Urine Appearance


  Slightly


cloudy 


  


 


 


Urine pH 6.5 (4.5-8.0)    


 


Urine Specific Gravity


  1.015


(1.005-1.035) 


  


 


 


Urine Protein


  2+ (NEGATIVE)


H 


  


 


 


Urine Glucose (UA)


  Negative


(NEGATIVE) 


  


 


 


Urine Ketones


  3+ (NEGATIVE)


H 


  


 


 


Urine Occult Blood


  1+ (NEGATIVE)


H 


  


 


 


Urine Nitrite


  Negative


(NEGATIVE) 


  


 


 


Urine Bilirubin


  1+ (NEGATIVE)


H 


  


 


 


Urine Ictotest


  Negative


(NEGATIVE) 


  


 


 


Urine Urobilinogen


  12 MG/DL


(0.0-1.0)  H 


  


 


 


Urine Leukocyte Esterase


  2+ (NEGATIVE)


H 


  


 


 


Urine RBC


  2-4 /HPF (0 -


2)  H 


  


 


 


Urine WBC


  5-10 /HPF (0 -


2)  H 


  


 


 


Urine Squamous Epithelial


Cells Few /LPF


(NONE/OCC) 


  


 


 


Urine Amorphous Sediment


  Few /LPF


(NONE)  H 


  


 


 


Urine Bacteria


  Few /HPF


(NONE) 


  


 


 


Urine HCG, Qualitative


  Negative


(NEGATIVE) 


  


 


 


Sodium Level


  133 MMOL/L


(136-145)  L 


  134 MMOL/L


(136-145)  L


 


Potassium Level


  2.6 MMOL/L


(3.5-5.1)  *L 


  3.8 MMOL/L


(3.5-5.1)


 


Chloride Level


  95 MMOL/L


()  L 


  102 MMOL/L


()


 


Carbon Dioxide Level


  23 MMOL/L


(21-32) 


  22 MMOL/L


(21-32)


 


Anion Gap


  15 mmol/L


(5-15) 


  10 mmol/L


(5-15)


 


Blood Urea Nitrogen


  11 mg/dL


(7-18) 


  6 mg/dL (7-18)


L


 


Creatinine


  0.8 MG/DL


(0.55-1.30) 


  0.6 MG/DL


(0.55-1.30)


 


Estimat Glomerular Filtration


Rate > 60 mL/min


(>60) 


  > 60 mL/min


(>60)


 


Glucose Level


  104 MG/DL


() 


  106 MG/DL


()


 


Calcium Level


  9.2 MG/DL


(8.5-10.1) 


  7.9 MG/DL


(8.5-10.1)  L


 


Total Bilirubin


  1.1 MG/DL


(0.2-1.0)  H 


  0.8 MG/DL


(0.2-1.0)


 


Direct Bilirubin


  0.3 MG/DL


(0.0-0.3) 


  


 


 


Aspartate Amino Transf


(AST/SGOT) 240 U/L


(15-37)  H 


  190 U/L


(15-37)  H


 


Alanine Aminotransferase


(ALT/SGPT) 533 U/L


(12-78)  H 


  471 U/L


(12-78)  H


 


Alkaline Phosphatase


  69 U/L


() 


  53 U/L


()


 


C-Reactive Protein,


Quantitative < 0.4 mg/dL


(0.00-0.90) 


  


 


 


Total Protein


  8.8 G/DL


(6.4-8.2)  H 


  7.2 G/DL


(6.4-8.2)


 


Albumin


  4.0 G/DL


(3.4-5.0) 


  3.3 G/DL


(3.4-5.0)  L


 


Globulin 4.8 g/dL    3.9 g/dL  


 


Albumin/Globulin Ratio


  0.8 (1.0-2.7)


L 


  0.8 (1.0-2.7)


L


 


Lipase


  189 U/L


() 


  


 


 


Human Chorionic Gonadotropin,


Quant < 1 mIU/mL


(1-6)  L 


  


 


 


Hepatitis A IgM Antibody  Pending   


 


Hepatitis B Surface Antigen  Pending   


 


Hepatitis B Core IgM Antibody  Pending   


 


Hepatitis C Antibody  Pending   


 


Uric Acid


  


  


  3.1 MG/DL


(2.6-7.2)


 


Phosphorus Level


  


  


  2.2 MG/DL


(2.5-4.9)  L


 


Magnesium Level


  


  


  1.9 MG/DL


(1.8-2.4)


 


Gamma Glutamyl Transpeptidase   59 U/L (5-85)  


 


Total Creatine Kinase


  


  


  569 U/L


()  H


 


Triglycerides Level


  


  


  56 MG/DL


()


 


Cholesterol Level


  


  


  95 MG/DL (<


200)


 


LDL Cholesterol


  


  


  45 mg/dL


(<100)


 


HDL Cholesterol


  


  


  42 MG/DL


(40-60)


 


Cholesterol/HDL Ratio


  


  


  2.3 (3.3-4.4)


L


 


Thyroid Stimulating Hormone


(TSH) 


  


  0.728 uiU/mL


(0.358-3.740)








Height (Feet):  5


Height (Inches):  1.00


Weight (Pounds):  129


Medications





Current Medications








 Medications


  (Trade)  Dose


 Ordered  Sig/Marlin


 Route


 PRN Reason  Start Time


 Stop Time Status Last Admin


Dose Admin


 


 Albuterol/


 Ipratropium


  (Albuterol/


 Ipratropium)  3 ml  Q4H  PRN


 HHN


 Shortness of Breath  8/22/18 08:15


 8/27/18 08:14  8/22/18 10:38


 


 


 Dextrose/


 Electrolytes  1,000 ml @ 


 75 mls/hr  [id number]


 IV


   8/21/18 22:30


 9/20/18 22:29  8/22/18 12:02


 


 


 Diatrizoate


 Meglum/


 Diatrizoate Sod


  (Gastrografin)  1 ml  NOW  PRN


 ORAL


 Radiology Procedure  8/22/18 10:00


 8/25/18 09:53   


 


 


 Diphenhydramine


 HCl


  (Benadryl)  25 mg  Q6H  PRN


 ORAL


 Itching/sleep  8/22/18 08:30


 9/21/18 08:14   


 


 


 Famotidine


  (Pepcid I.v.)  20 mg  Q12HR


 IVP


   8/22/18 09:00


 9/21/18 08:59  8/22/18 08:45


 


 


 Ondansetron HCl


  (Zofran)  4 mg  Q6H  PRN


 IVP


 Nausea & Vomiting  8/21/18 23:15


 9/20/18 23:14   


 


 


 Sodium Phosphate


 30 mm/Sodium


 Chloride  285 ml @ 


 47.5 mls/hr  ONCE  ONCE


 IVPB


   8/22/18 10:30


 8/22/18 16:29  8/22/18 11:21


 


 


 Tramadol HCl


  (Ultram)  25 mg  Q4HR  PRN


 ORAL


 Moderate Pain (Pain Scale 4-6)  8/22/18 12:15


 8/29/18 08:14  8/22/18 12:11


 











Assessment/Plan


Problem List:  


(1) Abdominal pain


Assessment & Plan:  possible related to intussusception or can be incidental 

findings.  


symptoms improved


exam benign


labs okay.  





will order small bowel series to ensure no obstruction


will follow clinically


thank you


ICD Codes:  R10.9 - Unspecified abdominal pain


SNOMED:  32485005


Status:  stable











Guanako La Aug 22, 2018 12:30

## 2018-08-22 NOTE — HISTORY AND PHYSICAL REPORT
DATE OF ADMISSION:  08/21/2018



HISTORY OF PRESENT ILLNESS:  The patient comes here and admitted for

abdominal pain and hypokalemia.  The patient basically being admitted for

abdominal pain and hypokalemia.  Also, CT of the abdomen shows transient

intussusception.  The patient been also having intractable nausea and

vomiting for couple days and very low potassium, most likely due to nausea

and vomiting.  The patient also has a borderline leukocytosis and LFT

elevation as well.  The patient is admitted for those reasons.



PAST MEDICAL HISTORY:  None.



ALLERGIES:  No known allergies.



MEDICATIONS:  None.



PAST SURGICAL HISTORY:  None known.



FAMILY HISTORY:  Noncontributory.



SOCIAL HISTORY:  Denies alcohol or illicit drugs.



REVIEW OF SYSTEMS:  HEENT:  Denies headaches.  PULMONARY:  Denies shortness

of breath.  Denies cough.  CARDIOVASCULAR:  Denies chest pain.  No

orthopnea.    GASTROINTESTINAL:  Reports nausea and vomiting intractable

for couple of days and some abdominal pain.  EXTREMITY:  Denies lower

extremity pain.  CENTRAL NERVOUS SYSTEM:  Denies change in vision or

speech pattern.



PHYSICAL EXAMINATION:

VITAL SIGNS:  Temperature is 97.7 degrees, pulse 73, and blood pressure

142/93.

HEENT:  PERRLA.

NECK:  Supple.  No lymphadenopathy.

CHEST:  Clear to auscultation.

GASTROINTESTINAL:  Soft.  Mildly distended, mild abdominal pain, although

the abdomen is soft.  No organomegaly.

EXTREMITIES:  No edema.

NEUROLOGIC:  Reflexes on some both sides.  Moves all four extremities.



LABORATORY AND DIAGNOSTIC DATA:  CT scan shows transient _____

intussusception.  Labs, WBC of 12.2, hemoglobin 15.7 and platelet 198.

Sodium 133, potassium 2.6, chloride 95, BUN of 11, creatinine 0.8 and

glucose of 104.  AST of 240 and ALT of 533.  Total bilirubin 1.1.



ASSESSMENT AND PLAN:

1. Transient intussusception.

2. Intractable vomiting related to intussusception.

3. Hypokalemia secondary to nausea and vomiting.

4. Elevated LFTs.



I have asked Dr. La, Dr. Connell, Dr. Cannon, Dr. Mohan, and

Dr. Gunderson to see the patient for the above-mentioned diagnoses and

treatment and for pain management.









  ______________________________________________

  Patricia Mattson M.D.





DR:  MILLA

D:  08/22/2018 15:22

T:  08/22/2018 23:27

JOB#:  [id number]

CC:

## 2018-08-23 VITALS — DIASTOLIC BLOOD PRESSURE: 63 MMHG | SYSTOLIC BLOOD PRESSURE: 136 MMHG

## 2018-08-23 VITALS — SYSTOLIC BLOOD PRESSURE: 134 MMHG | DIASTOLIC BLOOD PRESSURE: 98 MMHG

## 2018-08-23 VITALS — DIASTOLIC BLOOD PRESSURE: 74 MMHG | SYSTOLIC BLOOD PRESSURE: 104 MMHG

## 2018-08-23 VITALS — SYSTOLIC BLOOD PRESSURE: 128 MMHG | DIASTOLIC BLOOD PRESSURE: 90 MMHG

## 2018-08-23 VITALS — SYSTOLIC BLOOD PRESSURE: 105 MMHG | DIASTOLIC BLOOD PRESSURE: 62 MMHG

## 2018-08-23 VITALS — SYSTOLIC BLOOD PRESSURE: 124 MMHG | DIASTOLIC BLOOD PRESSURE: 95 MMHG

## 2018-08-23 LAB
ADD MANUAL DIFF: YES
ANION GAP SERPL CALC-SCNC: 11 MMOL/L (ref 5–15)
BUN SERPL-MCNC: 4 MG/DL (ref 7–18)
CALCIUM SERPL-MCNC: 8 MG/DL (ref 8.5–10.1)
CHLORIDE SERPL-SCNC: 101 MMOL/L (ref 98–107)
CO2 SERPL-SCNC: 25 MMOL/L (ref 21–32)
CREAT SERPL-MCNC: 0.5 MG/DL (ref 0.55–1.3)
ERYTHROCYTE [DISTWIDTH] IN BLOOD BY AUTOMATED COUNT: 10.4 % (ref 11.6–14.8)
HCT VFR BLD CALC: 37.3 % (ref 37–47)
HGB BLD-MCNC: 13 G/DL (ref 12–16)
MCV RBC AUTO: 90 FL (ref 80–99)
PLATELET # BLD: 164 K/UL (ref 150–450)
POTASSIUM SERPL-SCNC: 3.2 MMOL/L (ref 3.5–5.1)
RBC # BLD AUTO: 4.13 M/UL (ref 4.2–5.4)
SODIUM SERPL-SCNC: 136 MMOL/L (ref 136–145)
WBC # BLD AUTO: 7.8 K/UL (ref 4.8–10.8)

## 2018-08-23 RX ADMIN — TRAMADOL HYDROCHLORIDE PRN MG: 50 TABLET, FILM COATED ORAL at 20:53

## 2018-08-23 RX ADMIN — TRAMADOL HYDROCHLORIDE PRN MG: 50 TABLET, FILM COATED ORAL at 09:50

## 2018-08-23 RX ADMIN — TRAMADOL HYDROCHLORIDE PRN MG: 50 TABLET, FILM COATED ORAL at 16:23

## 2018-08-23 RX ADMIN — TRAMADOL HYDROCHLORIDE PRN MG: 50 TABLET, FILM COATED ORAL at 02:11

## 2018-08-23 NOTE — GENERAL PROGRESS NOTE
Assessment/Plan


Problem List:  


(1) Elevated liver enzymes


ICD Codes:  R74.8 - Abnormal levels of other serum enzymes


SNOMED:  588349174


(2) Intractable nausea and vomiting


ICD Codes:  R11.2 - Nausea with vomiting, unspecified


SNOMED:  042860105


(3) Hypokalemia


ICD Codes:  E87.6 - Hypokalemia


SNOMED:  95949752


(4) Abdominal pain


ICD Codes:  R10.9 - Unspecified abdominal pain


SNOMED:  36494527


(5) Intussusception of small bowel


ICD Codes:  K56.1 - Intussusception


SNOMED:  288359972


Status:  progressing


Assessment/Plan


N/V DECREASEd


intusseption treatment per recommenation of dr draper


check lft





Subjective


ROS Limited/Unobtainable:  Yes


Allergies:  


Coded Allergies:  


     No Known Allergies (Unverified , 8/21/18)





Objective





Last 24 Hour Vital Signs








  Date Time  Temp Pulse Resp B/P (MAP) Pulse Ox O2 Delivery O2 Flow Rate FiO2


 


8/23/18 11:48 98.2 61 18 134/98 (110) 99   





 98.2       


 


8/23/18 09:00      Room Air  


 


8/23/18 07:27 98.1 67 20 105/62 (76) 100   





 98.1       


 


8/23/18 07:15  68 16   Room Air  21


 


8/23/18 05:08 98.1       


 


8/23/18 04:16 98.1 73 18 104/74 (84) 100   





 98.1       


 


8/23/18 04:09 98.1       


 


8/23/18 03:10 98.1       


 


8/23/18 02:11 98.1       


 


8/23/18 00:37 98.1 91 19 136/63 (87) 100   





 98.1       


 


8/22/18 22:18  76  146/97 (113)    


 


8/22/18 21:55      Room Air  


 


8/22/18 21:45  70 18  9 Room Air  21


 


8/22/18 21:33  68 18  98 Room Air  21


 


8/22/18 21:16 98.1       


 


8/22/18 20:19 98.1 82 17 160/86 (110) 99   





 98.1       


 


8/22/18 19:59  70 18   Room Air  21

















Intake and Output  


 


 8/22/18 8/23/18





 19:00 07:00


 


Intake Total 300 ml 450 ml


 


Balance 300 ml 450 ml


 


  


 


Intake IV Total 300 ml 450 ml


 


# Voids 3 2








Laboratory Tests


8/23/18 06:10: 


White Blood Count 7.8, Red Blood Count 4.13L, Hemoglobin 13.0, Hematocrit 37.3, 

Mean Corpuscular Volume 90, Mean Corpuscular Hemoglobin 31.5H, Mean Corpuscular 

Hemoglobin Concent 34.9, Red Cell Distribution Width 10.4L, Platelet Count 164, 

Mean Platelet Volume 8.1, Neutrophils (%) (Auto) , Lymphocytes (%) (Auto) , 

Monocytes (%) (Auto) , Eosinophils (%) (Auto) , Basophils (%) (Auto) , 

Differential Total Cells Counted 100, Neutrophils % (Manual) 63, Lymphocytes % (

Manual) 28, Monocytes % (Manual) 6, Eosinophils % (Manual) 3, Basophils % (

Manual) 0, Band Neutrophils 0, Platelet Estimate Adequate, Platelet Morphology 

Normal, Red Blood Cell Morphology Normal, Sodium Level 136, Potassium Level 3.2L

, Chloride Level 101, Carbon Dioxide Level 25, Anion Gap 11, Blood Urea 

Nitrogen 4L, Creatinine 0.5L, Estimat Glomerular Filtration Rate > 60, Glucose 

Level 99, Calcium Level 8.0L


Height (Feet):  5


Height (Inches):  1.00


Weight (Pounds):  129


Respiratory/Chest:  lungs clear


Abdomen:  soft











Patricia Mattson MD Aug 23, 2018 12:39

## 2018-08-23 NOTE — GENERAL SURGERY PROGRESS NOTE
General Surgery-Progress Note


Subjective


Additional Comments


late entry for patient seen earlier today.  was pending radiological images and 

results


still with pain.  no n/v/f/c. 


states lower abdominal discomfort





Objective





Last 24 Hour Vital Signs








  Date Time  Temp Pulse Resp B/P (MAP) Pulse Ox O2 Delivery O2 Flow Rate FiO2


 


8/23/18 21:00      Room Air  


 


8/23/18 20:00 98.4 63 19 128/90 (103) 99   





 98.4       


 


8/23/18 18:51  79 18   Room Air  


 


8/23/18 15:55 97.5 80 20 124/95 (105) 100   





 97.5       


 


8/23/18 11:48 98.2 61 18 134/98 (110) 99   





 98.2       


 


8/23/18 09:00      Room Air  


 


8/23/18 07:27 98.1 67 20 105/62 (76) 100   





 98.1       


 


8/23/18 07:15  68 16   Room Air  21


 


8/23/18 05:08 98.1       


 


8/23/18 04:16 98.1 73 18 104/74 (84) 100   





 98.1       


 


8/23/18 04:09 98.1       


 


8/23/18 03:10 98.1       


 


8/23/18 02:11 98.1       


 


8/23/18 00:37 98.1 91 19 136/63 (87) 100   





 98.1       








I&O











Intake and Output  


 


 8/22/18 8/23/18





 19:00 07:00


 


Intake Total 300 ml 450 ml


 


Balance 300 ml 450 ml


 


  


 


IV Total 300 ml 450 ml


 


# Voids 3 2








Drains:  none


Cardiovascular:  RSR


Respiratory:  clear


Abdomen:  soft, flat, tenderness, present bowel sounds


Extremities:  no cyanosis





Laboratory Tests








Test


  8/23/18


06:10


 


White Blood Count


  7.8 K/UL


(4.8-10.8)


 


Red Blood Count


  4.13 M/UL


(4.20-5.40)  L


 


Hemoglobin


  13.0 G/DL


(12.0-16.0)


 


Hematocrit


  37.3 %


(37.0-47.0)


 


Mean Corpuscular Volume 90 FL (80-99)  


 


Mean Corpuscular Hemoglobin


  31.5 PG


(27.0-31.0)  H


 


Mean Corpuscular Hemoglobin


Concent 34.9 G/DL


(32.0-36.0)


 


Red Cell Distribution Width


  10.4 %


(11.6-14.8)  L


 


Platelet Count


  164 K/UL


(150-450)


 


Mean Platelet Volume


  8.1 FL


(6.5-10.1)


 


Neutrophils (%) (Auto)


  % (45.0-75.0)


 


 


Lymphocytes (%) (Auto)


  % (20.0-45.0)


 


 


Monocytes (%) (Auto)  % (1.0-10.0)  


 


Eosinophils (%) (Auto)  % (0.0-3.0)  


 


Basophils (%) (Auto)  % (0.0-2.0)  


 


Differential Total Cells


Counted 100  


 


 


Neutrophils % (Manual) 63 % (45-75)  


 


Lymphocytes % (Manual) 28 % (20-45)  


 


Monocytes % (Manual) 6 % (1-10)  


 


Eosinophils % (Manual) 3 % (0-3)  


 


Basophils % (Manual) 0 % (0-2)  


 


Band Neutrophils 0 % (0-8)  


 


Platelet Estimate Adequate  


 


Platelet Morphology Normal  


 


Red Blood Cell Morphology Normal  


 


Sodium Level


  136 MMOL/L


(136-145)


 


Potassium Level


  3.2 MMOL/L


(3.5-5.1)  L


 


Chloride Level


  101 MMOL/L


()


 


Carbon Dioxide Level


  25 MMOL/L


(21-32)


 


Anion Gap


  11 mmol/L


(5-15)


 


Blood Urea Nitrogen


  4 mg/dL (7-18)


L


 


Creatinine


  0.5 MG/DL


(0.55-1.30)  L


 


Estimat Glomerular Filtration


Rate > 60 mL/min


(>60)


 


Glucose Level


  99 MG/DL


()


 


Calcium Level


  8.0 MG/DL


(8.5-10.1)  L











Plan


Problems:  


(1) Abdominal pain


Assessment & Plan:  possible related to intussusception or can be incidental 

findings.  


symptoms improved


exam benign


labs okay.  


small bowel series without obstruction





no acute surgical intervention indicated


trail diet


may consider repeat CT but will follow clinically for now. 





thank you  














Gu[place]ako La Aug 23, 2018 23:12

## 2018-08-23 NOTE — DIAGNOSTIC IMAGING REPORT
Indication: Pain. Finding of small bowel small bowel intussusception in the left

lower quadrant on CT 8/20/2018.

 

Technique: Small bowel follow-through was obtained.  radiograph was obtained.

Oral contrast wasn't administered and whole radiographic images were obtained up to 7

hours post administration of contrast.

 

Comparison: CT of the abdomen and pelvis 8/21/2018

 

Findings:  radiograph demonstrates some retained contrast in the colon from oral

contrast administration on 8/20/2018. No abnormal distention of small bowel loops is

noted. Surgical clips are noted in the right upper quadrant from prior

cholecystectomy. No acute osseous abnormality is noted. Subsequently oral contrast

was administered and radiographic images were obtained. Immediate post administration

images demonstrate normal caliber stomach and proximal small bowel with normal C-loop

of the duodenum and normal positioning of the ligament of Treitz to the left of

midline; there is no evidence to suggest intestinal malrotation. Subsequent images

demonstrate expected passage of contrast through small bowel loops. No abnormal

distention of small bowel loops is noted. Specifically there is no evidence of

obstruction through the region of previously described intussusception the left lower

quadrant. Final image demonstrates passage of majority of contrast into the right

colon.

 

IMPRESSION: 

Small bowel follow-through as above without evidence to suggest small bowel

obstruction.

## 2018-08-23 NOTE — GI PROGRESS NOTE
Assessment/Plan


Problems:  


(1) Intussusception of small bowel


ICD Codes:  K56.1 - Intussusception


SNOMED:  069630638


(2) Abdominal pain


ICD Codes:  R10.9 - Unspecified abdominal pain


SNOMED:  80324971


(3) Intractable nausea and vomiting


ICD Codes:  R11.2 - Nausea with vomiting, unspecified


SNOMED:  680914527


(4) Elevated liver enzymes


ICD Codes:  R74.8 - Abnormal levels of other serum enzymes


SNOMED:  990947442


Status:  stable


Status Narrative


Discussed with Dr. Cannon.


Assessment/Plan


Small bowel follow-through as above without evidence to suggest small bowel 

obstruction.


transaminitis


symptomatic treatment


CLD


pain mgmt


zofran prn


trend LFTs


fu labs





The patient was seen and examined at bedside and all new and available data was 

reviewed in the patients chart. I agree with the above findings, impression 

and plan.  (Patient seen earlier today. Signature stamp does not reflect 

patient encounter time.). - Hany Cannon MD





Subjective


Subjective


N/V improved





Objective





Last 24 Hour Vital Signs








  Date Time  Temp Pulse Resp B/P (MAP) Pulse Ox O2 Delivery O2 Flow Rate FiO2


 


8/23/18 11:48 98.2 61 18 134/98 (110) 99   





 98.2       


 


8/23/18 09:00      Room Air  


 


8/23/18 07:27 98.1 67 20 105/62 (76) 100   





 98.1       


 


8/23/18 07:15  68 16   Room Air  21


 


8/23/18 05:08 98.1       


 


8/23/18 04:16 98.1 73 18 104/74 (84) 100   





 98.1       


 


8/23/18 04:09 98.1       


 


8/23/18 03:10 98.1       


 


8/23/18 02:11 98.1       


 


8/23/18 00:37 98.1 91 19 136/63 (87) 100   





 98.1       


 


8/22/18 22:18  76  146/97 (113)    


 


8/22/18 21:55      Room Air  


 


8/22/18 21:45  70 18  9 Room Air  21


 


8/22/18 21:33  68 18  98 Room Air  21


 


8/22/18 21:16 98.1       


 


8/22/18 20:19 98.1 82 17 160/86 (110) 99   





 98.1       


 


8/22/18 19:59  70 18   Room Air  21

















Intake and Output  


 


 8/22/18 8/23/18





 19:00 07:00


 


Intake Total 300 ml 450 ml


 


Balance 300 ml 450 ml


 


  


 


Intake IV Total 300 ml 450 ml


 


# Voids 3 2











Laboratory Tests








Test


  8/23/18


06:10


 


White Blood Count


  7.8 K/UL


(4.8-10.8)


 


Red Blood Count


  4.13 M/UL


(4.20-5.40)  L


 


Hemoglobin


  13.0 G/DL


(12.0-16.0)


 


Hematocrit


  37.3 %


(37.0-47.0)


 


Mean Corpuscular Volume 90 FL (80-99)  


 


Mean Corpuscular Hemoglobin


  31.5 PG


(27.0-31.0)  H


 


Mean Corpuscular Hemoglobin


Concent 34.9 G/DL


(32.0-36.0)


 


Red Cell Distribution Width


  10.4 %


(11.6-14.8)  L


 


Platelet Count


  164 K/UL


(150-450)


 


Mean Platelet Volume


  8.1 FL


(6.5-10.1)


 


Neutrophils (%) (Auto)


  % (45.0-75.0)


 


 


Lymphocytes (%) (Auto)


  % (20.0-45.0)


 


 


Monocytes (%) (Auto)  % (1.0-10.0)  


 


Eosinophils (%) (Auto)  % (0.0-3.0)  


 


Basophils (%) (Auto)  % (0.0-2.0)  


 


Differential Total Cells


Counted 100  


 


 


Neutrophils % (Manual) 63 % (45-75)  


 


Lymphocytes % (Manual) 28 % (20-45)  


 


Monocytes % (Manual) 6 % (1-10)  


 


Eosinophils % (Manual) 3 % (0-3)  


 


Basophils % (Manual) 0 % (0-2)  


 


Band Neutrophils 0 % (0-8)  


 


Platelet Estimate Adequate  


 


Platelet Morphology Normal  


 


Red Blood Cell Morphology Normal  


 


Sodium Level


  136 MMOL/L


(136-145)


 


Potassium Level


  3.2 MMOL/L


(3.5-5.1)  L


 


Chloride Level


  101 MMOL/L


()


 


Carbon Dioxide Level


  25 MMOL/L


(21-32)


 


Anion Gap


  11 mmol/L


(5-15)


 


Blood Urea Nitrogen


  4 mg/dL (7-18)


L


 


Creatinine


  0.5 MG/DL


(0.55-1.30)  L


 


Estimat Glomerular Filtration


Rate > 60 mL/min


(>60)


 


Glucose Level


  99 MG/DL


()


 


Calcium Level


  8.0 MG/DL


(8.5-10.1)  L








Height (Feet):  5


Height (Inches):  1.00


Weight (Pounds):  129


General Appearance:  WD/WN, no apparent distress, alert


Cardiovascular:  normal rate


Respiratory/Chest:  normal breath sounds, no respiratory distress


Abdominal Exam:  normal bowel sounds, non tender, soft


Extremities:  normal range of motion, non-tender











Kayce Amaya NP Aug 23, 2018 13:19

## 2018-08-24 VITALS — SYSTOLIC BLOOD PRESSURE: 116 MMHG | DIASTOLIC BLOOD PRESSURE: 69 MMHG

## 2018-08-24 VITALS — DIASTOLIC BLOOD PRESSURE: 83 MMHG | SYSTOLIC BLOOD PRESSURE: 118 MMHG

## 2018-08-24 VITALS — SYSTOLIC BLOOD PRESSURE: 141 MMHG | DIASTOLIC BLOOD PRESSURE: 102 MMHG

## 2018-08-24 VITALS — DIASTOLIC BLOOD PRESSURE: 92 MMHG | SYSTOLIC BLOOD PRESSURE: 152 MMHG

## 2018-08-24 VITALS — DIASTOLIC BLOOD PRESSURE: 62 MMHG | SYSTOLIC BLOOD PRESSURE: 111 MMHG

## 2018-08-24 VITALS — DIASTOLIC BLOOD PRESSURE: 86 MMHG | SYSTOLIC BLOOD PRESSURE: 148 MMHG

## 2018-08-24 LAB
ADD MANUAL DIFF: NO
ALBUMIN SERPL-MCNC: 3 G/DL (ref 3.4–5)
ALBUMIN/GLOB SERPL: 0.9 {RATIO} (ref 1–2.7)
ALP SERPL-CCNC: 53 U/L (ref 46–116)
ALT SERPL-CCNC: 340 U/L (ref 12–78)
ANION GAP SERPL CALC-SCNC: 8 MMOL/L (ref 5–15)
AST SERPL-CCNC: 65 U/L (ref 15–37)
BASOPHILS NFR BLD AUTO: 0.2 % (ref 0–2)
BILIRUB SERPL-MCNC: 0.6 MG/DL (ref 0.2–1)
BUN SERPL-MCNC: 4 MG/DL (ref 7–18)
CALCIUM SERPL-MCNC: 8.1 MG/DL (ref 8.5–10.1)
CHLORIDE SERPL-SCNC: 105 MMOL/L (ref 98–107)
CO2 SERPL-SCNC: 25 MMOL/L (ref 21–32)
CREAT SERPL-MCNC: 0.6 MG/DL (ref 0.55–1.3)
EOSINOPHIL NFR BLD AUTO: 2 % (ref 0–3)
ERYTHROCYTE [DISTWIDTH] IN BLOOD BY AUTOMATED COUNT: 10.4 % (ref 11.6–14.8)
GLOBULIN SER-MCNC: 3.2 G/DL
HCT VFR BLD CALC: 34.3 % (ref 37–47)
HGB BLD-MCNC: 11.7 G/DL (ref 12–16)
LYMPHOCYTES NFR BLD AUTO: 20.9 % (ref 20–45)
MCV RBC AUTO: 91 FL (ref 80–99)
MONOCYTES NFR BLD AUTO: 4.4 % (ref 1–10)
NEUTROPHILS NFR BLD AUTO: 72.4 % (ref 45–75)
PLATELET # BLD: 148 K/UL (ref 150–450)
POTASSIUM SERPL-SCNC: 3.6 MMOL/L (ref 3.5–5.1)
RBC # BLD AUTO: 3.77 M/UL (ref 4.2–5.4)
SODIUM SERPL-SCNC: 138 MMOL/L (ref 136–145)
WBC # BLD AUTO: 9.1 K/UL (ref 4.8–10.8)

## 2018-08-24 RX ADMIN — DOCUSATE SODIUM SCH MG: 100 CAPSULE, LIQUID FILLED ORAL at 09:19

## 2018-08-24 RX ADMIN — IPRATROPIUM BROMIDE AND ALBUTEROL SULFATE PRN ML: .5; 3 SOLUTION RESPIRATORY (INHALATION) at 08:39

## 2018-08-24 RX ADMIN — TRAMADOL HYDROCHLORIDE PRN MG: 50 TABLET, FILM COATED ORAL at 08:35

## 2018-08-24 RX ADMIN — DOCUSATE SODIUM SCH MG: 100 CAPSULE, LIQUID FILLED ORAL at 17:08

## 2018-08-24 RX ADMIN — DOCUSATE SODIUM SCH MG: 100 CAPSULE, LIQUID FILLED ORAL at 12:34

## 2018-08-24 RX ADMIN — TRAMADOL HYDROCHLORIDE PRN MG: 50 TABLET, FILM COATED ORAL at 01:29

## 2018-08-24 NOTE — DIAGNOSTIC IMAGING REPORT
. Indication: Abdominal pain, recent small bowel follow-through

 

Technique: Supine view of the abdomen

 

Comparison: Small bowel follow-through of 8/22/2018 and 8/23/2018

 

Findings: Interim passage of all of the ingested contrast into the colon. Prominent

gas-filled colon loops are seen in the left upper quadrant. No gaseous distention of

small bowel.

 

Impression: Essentially unremarkable, with passage of the entirety of contrast into

the colon. No evidence of small bowel obstruction

## 2018-08-24 NOTE — GENERAL SURGERY PROGRESS NOTE
General Surgery-Progress Note


Subjective


Additional Comments


doing well.  abd cramping.  no n/v/f/c. radiological images reviewed and no 

obstruction noted.





Objective





Last 24 Hour Vital Signs








  Date Time  Temp Pulse Resp B/P (MAP) Pulse Ox O2 Delivery O2 Flow Rate FiO2


 


8/24/18 08:41 97.5 95 20 141/102 (115) 100   





 97.5       


 


8/24/18 08:38  66 18  98 Room Air  21


 


8/24/18 08:34  74 18   Room Air  21


 


8/24/18 07:29      Room Air  


 


8/24/18 04:00 97.7 74 19 116/69 (85) 99   





 97.7       


 


8/24/18 00:00 97.5 63 20 118/83 (95) 100   





 97.5       


 


8/23/18 21:00      Room Air  


 


8/23/18 20:00 98.4 63 19 128/90 (103) 99   





 98.4       


 


8/23/18 18:51  79 18   Room Air  


 


8/23/18 15:55 97.5 80 20 124/95 (105) 100   





 97.5       


 


8/23/18 11:48 98.2 61 18 134/98 (110) 99   





 98.2       








I&O











Intake and Output  


 


 8/23/18 8/24/18





 19:00 07:00


 


Intake Total 1765 ml 1900 ml


 


Balance 1765 ml 1900 ml


 


  


 


Intake Oral 940 ml 1000 ml


 


IV Total 825 ml 900 ml


 


# Voids 4 5








Cardiovascular:  RSR


Respiratory:  clear


Abdomen:  soft, flat, non-tender, present bowel sounds


Extremities:  no edema, no tenderness, no cyanosis





Laboratory Tests








Test


  8/24/18


05:20


 


White Blood Count


  9.1 K/UL


(4.8-10.8)


 


Red Blood Count


  3.77 M/UL


(4.20-5.40)  L


 


Hemoglobin


  11.7 G/DL


(12.0-16.0)  L


 


Hematocrit


  34.3 %


(37.0-47.0)  L


 


Mean Corpuscular Volume 91 FL (80-99)  


 


Mean Corpuscular Hemoglobin


  31.0 PG


(27.0-31.0)


 


Mean Corpuscular Hemoglobin


Concent 34.1 G/DL


(32.0-36.0)


 


Red Cell Distribution Width


  10.4 %


(11.6-14.8)  L


 


Platelet Count


  148 K/UL


(150-450)  L


 


Mean Platelet Volume


  7.5 FL


(6.5-10.1)


 


Neutrophils (%) (Auto)


  72.4 %


(45.0-75.0)


 


Lymphocytes (%) (Auto)


  20.9 %


(20.0-45.0)


 


Monocytes (%) (Auto)


  4.4 %


(1.0-10.0)


 


Eosinophils (%) (Auto)


  2.0 %


(0.0-3.0)


 


Basophils (%) (Auto)


  0.2 %


(0.0-2.0)


 


Sodium Level


  138 MMOL/L


(136-145)


 


Potassium Level


  3.6 MMOL/L


(3.5-5.1)


 


Chloride Level


  105 MMOL/L


()


 


Carbon Dioxide Level


  25 MMOL/L


(21-32)


 


Anion Gap


  8 mmol/L


(5-15)


 


Blood Urea Nitrogen


  4 mg/dL (7-18)


L


 


Creatinine


  0.6 MG/DL


(0.55-1.30)


 


Estimat Glomerular Filtration


Rate > 60 mL/min


(>60)


 


Glucose Level


  91 MG/DL


()


 


Calcium Level


  8.1 MG/DL


(8.5-10.1)  L


 


Total Bilirubin


  0.6 MG/DL


(0.2-1.0)


 


Aspartate Amino Transf


(AST/SGOT) 65 U/L (15-37)


H


 


Alanine Aminotransferase


(ALT/SGPT) 340 U/L


(12-78)  H


 


Alkaline Phosphatase


  53 U/L


()


 


Total Protein


  6.2 G/DL


(6.4-8.2)  L


 


Albumin


  3.0 G/DL


(3.4-5.0)  L


 


Globulin 3.2 g/dL  


 


Albumin/Globulin Ratio


  0.9 (1.0-2.7)


L











Plan


Problems:  


(1) Abdominal pain


Assessment & Plan:  possible related to intussusception or can be incidental 

findings.  


symptoms improved


exam benign


labs okay.  


small bowel series without obstruction


intussusception likely transient.  in young 29yo female with normal small bowel 

series do not recommend radiation dose of CT to ensure incidental 

intussusception 


tolerating oral diet








no acute surgical intervention indicated


soft diet


good bowel regimen


enema 


once BM can d/c 








thank you  














Guanako La Aug 24, 2018 10:34

## 2018-08-24 NOTE — GI PROGRESS NOTE
Assessment/Plan


Problems:  


(1) Intussusception of small bowel


ICD Codes:  K56.1 - Intussusception


SNOMED:  167656121


(2) Abdominal pain


ICD Codes:  R10.9 - Unspecified abdominal pain


SNOMED:  96197114


(3) Intractable nausea and vomiting


ICD Codes:  R11.2 - Nausea with vomiting, unspecified


SNOMED:  544686018


(4) Elevated liver enzymes


ICD Codes:  R74.8 - Abnormal levels of other serum enzymes


SNOMED:  171429494


Status:  stable


Status Narrative


Discussed with Dr. Cannon.


Assessment/Plan


Small bowel follow-through as above without evidence to suggest small bowel 

obstruction.


transaminitis


symptomatic treatment


adv to regular diet


pain mgmt


zofran prn


trend LFTs


fu labs


dc planning





The patient was seen and examined at bedside and all new and available data was 

reviewed in the patients chart. I agree with the above findings, impression 

and plan.  (Patient seen earlier today. Signature stamp does not reflect 

patient encounter time.). - Hany Cannon MD





Subjective


Subjective


N/V improved


feels constipated





Objective





Last 24 Hour Vital Signs








  Date Time  Temp Pulse Resp B/P (MAP) Pulse Ox O2 Delivery O2 Flow Rate FiO2


 


8/24/18 11:58 97.0 91 20 152/92 (112) 100   





 97.0       


 


8/24/18 08:41 97.5 95 20 141/102 (115) 100   





 97.5       


 


8/24/18 08:38  66 18  98 Room Air  21


 


8/24/18 08:34  74 18   Room Air  21


 


8/24/18 07:29      Room Air  


 


8/24/18 04:00 97.7 74 19 116/69 (85) 99   





 97.7       


 


8/24/18 00:00 97.5 63 20 118/83 (95) 100   





 97.5       


 


8/23/18 21:00      Room Air  


 


8/23/18 20:00 98.4 63 19 128/90 (103) 99   





 98.4       


 


8/23/18 18:51  79 18   Room Air  


 


8/23/18 15:55 97.5 80 20 124/95 (105) 100   





 97.5       

















Intake and Output  


 


 8/23/18 8/24/18





 19:00 07:00


 


Intake Total 1765 ml 1900 ml


 


Balance 1765 ml 1900 ml


 


  


 


Intake Oral 940 ml 1000 ml


 


IV Total 825 ml 900 ml


 


# Voids 4 5











Laboratory Tests








Test


  8/24/18


05:20


 


White Blood Count


  9.1 K/UL


(4.8-10.8)


 


Red Blood Count


  3.77 M/UL


(4.20-5.40)  L


 


Hemoglobin


  11.7 G/DL


(12.0-16.0)  L


 


Hematocrit


  34.3 %


(37.0-47.0)  L


 


Mean Corpuscular Volume 91 FL (80-99)  


 


Mean Corpuscular Hemoglobin


  31.0 PG


(27.0-31.0)


 


Mean Corpuscular Hemoglobin


Concent 34.1 G/DL


(32.0-36.0)


 


Red Cell Distribution Width


  10.4 %


(11.6-14.8)  L


 


Platelet Count


  148 K/UL


(150-450)  L


 


Mean Platelet Volume


  7.5 FL


(6.5-10.1)


 


Neutrophils (%) (Auto)


  72.4 %


(45.0-75.0)


 


Lymphocytes (%) (Auto)


  20.9 %


(20.0-45.0)


 


Monocytes (%) (Auto)


  4.4 %


(1.0-10.0)


 


Eosinophils (%) (Auto)


  2.0 %


(0.0-3.0)


 


Basophils (%) (Auto)


  0.2 %


(0.0-2.0)


 


Sodium Level


  138 MMOL/L


(136-145)


 


Potassium Level


  3.6 MMOL/L


(3.5-5.1)


 


Chloride Level


  105 MMOL/L


()


 


Carbon Dioxide Level


  25 MMOL/L


(21-32)


 


Anion Gap


  8 mmol/L


(5-15)


 


Blood Urea Nitrogen


  4 mg/dL (7-18)


L


 


Creatinine


  0.6 MG/DL


(0.55-1.30)


 


Estimat Glomerular Filtration


Rate > 60 mL/min


(>60)


 


Glucose Level


  91 MG/DL


()


 


Calcium Level


  8.1 MG/DL


(8.5-10.1)  L


 


Total Bilirubin


  0.6 MG/DL


(0.2-1.0)


 


Aspartate Amino Transf


(AST/SGOT) 65 U/L (15-37)


H


 


Alanine Aminotransferase


(ALT/SGPT) 340 U/L


(12-78)  H


 


Alkaline Phosphatase


  53 U/L


()


 


Total Protein


  6.2 G/DL


(6.4-8.2)  L


 


Albumin


  3.0 G/DL


(3.4-5.0)  L


 


Globulin 3.2 g/dL  


 


Albumin/Globulin Ratio


  0.9 (1.0-2.7)


L








Height (Feet):  5


Height (Inches):  1.00


Weight (Pounds):  129


General Appearance:  WD/WN, no apparent distress, alert, thin


Cardiovascular:  normal rate


Respiratory/Chest:  normal breath sounds, no respiratory distress


Abdominal Exam:  normal bowel sounds, non tender, soft


Extremities:  normal range of motion, non-tender











Kayce Amaya NP Aug 24, 2018 12:08

## 2018-08-24 NOTE — GENERAL PROGRESS NOTE
Assessment/Plan


Problem List:  


(1) Elevated liver enzymes


ICD Codes:  R74.8 - Abnormal levels of other serum enzymes


SNOMED:  967156531


(2) Intractable nausea and vomiting


ICD Codes:  R11.2 - Nausea with vomiting, unspecified


SNOMED:  053236840


(3) Hypokalemia


ICD Codes:  E87.6 - Hypokalemia


SNOMED:  96488309


(4) Abdominal pain


ICD Codes:  R10.9 - Unspecified abdominal pain


SNOMED:  38634567


(5) Intussusception of small bowel


ICD Codes:  K56.1 - Intussusception


SNOMED:  625477470


Assessment/Plan


will dc once cleared by gi dr and dr draper and can tolerate po food


intusseption treatment per recommenation of dr draper


check lft





Subjective


ROS Limited/Unobtainable:  Yes


Allergies:  


Coded Allergies:  


     No Known Allergies (Unverified , 8/21/18)





Objective





Last 24 Hour Vital Signs








  Date Time  Temp Pulse Resp B/P (MAP) Pulse Ox O2 Delivery O2 Flow Rate FiO2


 


8/24/18 11:58 97.0 91 20 152/92 (112) 100   





 97.0       


 


8/24/18 08:41 97.5 95 20 141/102 (115) 100   





 97.5       


 


8/24/18 08:38  66 18  98 Room Air  21


 


8/24/18 08:34  74 18   Room Air  21


 


8/24/18 07:29      Room Air  


 


8/24/18 04:00 97.7 74 19 116/69 (85) 99   





 97.7       


 


8/24/18 00:00 97.5 63 20 118/83 (95) 100   





 97.5       


 


8/23/18 21:00      Room Air  


 


8/23/18 20:00 98.4 63 19 128/90 (103) 99   





 98.4       


 


8/23/18 18:51  79 18   Room Air  


 


8/23/18 15:55 97.5 80 20 124/95 (105) 100   





 97.5       

















Intake and Output  


 


 8/23/18 8/24/18





 19:00 07:00


 


Intake Total 1765 ml 1900 ml


 


Balance 1765 ml 1900 ml


 


  


 


Intake Oral 940 ml 1000 ml


 


IV Total 825 ml 900 ml


 


# Voids 4 5








Laboratory Tests


8/24/18 05:20: 


White Blood Count 9.1, Red Blood Count 3.77L, Hemoglobin 11.7L, Hematocrit 34.3L

, Mean Corpuscular Volume 91, Mean Corpuscular Hemoglobin 31.0, Mean 

Corpuscular Hemoglobin Concent 34.1, Red Cell Distribution Width 10.4L, 

Platelet Count 148L, Mean Platelet Volume 7.5, Neutrophils (%) (Auto) 72.4, 

Lymphocytes (%) (Auto) 20.9, Monocytes (%) (Auto) 4.4, Eosinophils (%) (Auto) 

2.0, Basophils (%) (Auto) 0.2, Sodium Level 138, Potassium Level 3.6, Chloride 

Level 105, Carbon Dioxide Level 25, Anion Gap 8, Blood Urea Nitrogen 4L, 

Creatinine 0.6, Estimat Glomerular Filtration Rate > 60, Glucose Level 91, 

Calcium Level 8.1L, Total Bilirubin 0.6, Aspartate Amino Transf (AST/SGOT) 65H, 

Alanine Aminotransferase (ALT/SGPT) 340H, Alkaline Phosphatase 53, Total 

Protein 6.2L, Albumin 3.0L, Globulin 3.2, Albumin/Globulin Ratio 0.9L


Height (Feet):  5


Height (Inches):  1.00


Weight (Pounds):  129


Respiratory/Chest:  lungs clear


Abdomen:  soft











Patricia Mattson MD Aug 24, 2018 13:15

## 2018-08-25 VITALS — DIASTOLIC BLOOD PRESSURE: 80 MMHG | SYSTOLIC BLOOD PRESSURE: 120 MMHG

## 2018-08-25 VITALS — DIASTOLIC BLOOD PRESSURE: 77 MMHG | SYSTOLIC BLOOD PRESSURE: 109 MMHG

## 2018-08-25 VITALS — SYSTOLIC BLOOD PRESSURE: 124 MMHG | DIASTOLIC BLOOD PRESSURE: 83 MMHG

## 2018-08-25 VITALS — SYSTOLIC BLOOD PRESSURE: 130 MMHG | DIASTOLIC BLOOD PRESSURE: 93 MMHG

## 2018-08-25 VITALS — DIASTOLIC BLOOD PRESSURE: 86 MMHG | SYSTOLIC BLOOD PRESSURE: 122 MMHG

## 2018-08-25 RX ADMIN — DOCUSATE SODIUM SCH MG: 100 CAPSULE, LIQUID FILLED ORAL at 08:37

## 2018-08-25 RX ADMIN — DOCUSATE SODIUM SCH MG: 100 CAPSULE, LIQUID FILLED ORAL at 13:20

## 2018-08-25 NOTE — GENERAL PROGRESS NOTE
Assessment/Plan


Problem List:  


(1) Abdominal pain


ICD Codes:  R10.9 - Unspecified abdominal pain


SNOMED:  94291352


(2) Hypokalemia


ICD Codes:  E87.6 - Hypokalemia


SNOMED:  95288304


Status:  stable, progressing


Assessment/Plan


cbc bmp am dc if clear





Subjective


Constitutional:  Reports: weakness


Allergies:  


Coded Allergies:  


     No Known Allergies (Unverified , 8/21/18)


All Systems:  reviewed and negative except above


Subjective


calm in bed





Objective





Last 24 Hour Vital Signs








  Date Time  Temp Pulse Resp B/P (MAP) Pulse Ox O2 Delivery O2 Flow Rate FiO2


 


8/25/18 08:57      Room Air  


 


8/25/18 08:15 98.2 90 18 130/93 (105) 100   





 98.2       


 


8/25/18 04:00 98.2 83 17 120/80 (93) 98   





 98.2       


 


8/25/18 00:00 98.2 84 18 124/83 (97) 99   





 98.2       


 


8/24/18 21:00      Room Air  


 


8/24/18 20:00 98.2 90 20 148/86 (106) 99   





 98.2       


 


8/24/18 19:41  70 18   Room Air  21


 


8/24/18 16:20 97.2 66 20 111/62 (78) 100   





 97.2       


 


8/24/18 11:58 97.0 91 20 152/92 (112) 100   





 97.0       

















Intake and Output  


 


 8/24/18 8/25/18





 19:00 07:00


 


Intake Total 1250 ml 1200 ml


 


Output Total 1 ml 


 


Balance 1249 ml 1200 ml


 


  


 


Intake Oral 650 ml 300 ml


 


IV Total 600 ml 900 ml


 


Output Emesis 1 ml 


 


# Voids 3 3


 


# Bowel Movements 1 








Height (Feet):  5


Height (Inches):  1.00


Weight (Pounds):  129


General Appearance:  alert


EENT:  normal ENT inspection


Neck:  normal alignment


Cardiovascular:  normal peripheral pulses, normal rate, regular rhythm


Respiratory/Chest:  chest wall non-tender, lungs clear, normal breath sounds


Abdomen:  normal bowel sounds, non tender, soft


Extremities:  normal inspection


Edema:  no edema noted Arm (L), no edema noted Arm (R), no edema noted Leg (L), 

no edema noted Leg (R), no edema noted Pedal (L), no edema noted Pedal (R), no 

edema noted Generalized


Neurologic:  responsive, motor weakness


Skin:  normal pigmentation, warm/dry











Paramjit Morales DO Aug 25, 2018 10:37

## 2018-08-25 NOTE — GENERAL SURGERY PROGRESS NOTE
General Surgery-Progress Note


Subjective


Symptoms:  improved, pain absent, tolerating diet, passing flatus, BM


Additional Comments


doing well. pain resolved.  no n/v/f/c.  had BM





Objective





Last 24 Hour Vital Signs








  Date Time  Temp Pulse Resp B/P (MAP) Pulse Ox O2 Delivery O2 Flow Rate FiO2


 


8/25/18 11:35 97.0 66 18 109/77 (88) 95   





 97.0       


 


8/25/18 10:55  66 20   Room Air  21


 


8/25/18 08:57      Room Air  


 


8/25/18 08:15 98.2 90 18 130/93 (105) 100   





 98.2       


 


8/25/18 04:00 98.2 83 17 120/80 (93) 98   





 98.2       


 


8/25/18 00:00 98.2 84 18 124/83 (97) 99   





 98.2       


 


8/24/18 21:00      Room Air  


 


8/24/18 20:00 98.2 90 20 148/86 (106) 99   





 98.2       


 


8/24/18 19:41  70 18   Room Air  21


 


8/24/18 16:20 97.2 66 20 111/62 (78) 100   





 97.2       








I&O











Intake and Output  


 


 8/24/18 8/25/18





 19:00 07:00


 


Intake Total 1250 ml 1200 ml


 


Output Total 1 ml 


 


Balance 1249 ml 1200 ml


 


  


 


Intake Oral 650 ml 300 ml


 


IV Total 600 ml 900 ml


 


Output Emesis 1 ml 


 


# Voids 3 3


 


# Bowel Movements 1 








Drains:  none


Cardiovascular:  RSR


Respiratory:  clear


Abdomen:  soft, flat, non-tender, present bowel sounds


Extremities:  no cyanosis





Plan


Problems:  


(1) Abdominal pain


Assessment & Plan:  possible related to intussusception or can be incidental 

findings.  


symptoms improved


exam benign


labs okay.  


small bowel series without obstruction


intussusception likely transient.  in young 29yo female with normal small bowel 

series do not recommend radiation dose of CT to ensure incidental 

intussusception 


tolerating oral diet


had BM and now pain resolved





constipation vs IBS?





no acute surgical intervention indicated


soft diet


good bowel regimen


d/c home


outpatient GI follow up 








thank you  














Guanako La Aug 25, 2018 13:31

## 2018-08-27 NOTE — DISCHARGE SUMMARY
Discharge Summary


Discharge Summary


_


DATE OF ADMISSION: 08/21/2018





DATE OF DISCHARGE: 08/25/2018





REASON FOR ADMISSION: 


28 years old female with past medical history of cholecystectomy, presented to 

emergency department with abdominal pain and vomiting for 5 days. 


Abdominal pain started gradually, localized to all  abdomen, nonradiating, 

sharp and intermittent.  


Pain described as 5 out of 10 on a scale 1-10 


She reported nausea, multiple  nonbloody and nonbilious vomiting episodes,  no 

diarrhea.  


No flatus in 2 days.  


No bowel movement in 1 week.


She denied fever and chills.  


Vital signs revealed  tachycardia 116, no fever.  


Laboratory workup revealed leukocytosis with WBC 12.2. 


Stable hemoglobin and hematocrit.   


K-2.6, otherwise stable electrolytes and renal parameters. 


, . Lipase stable.


Urinalysis revealed + 2 protein ,+3 ketones, no evidence of UTI.


Urine pregnancy test negative.


CT of abdomen and pelvis revealed surgically absent gallbaldder. Small focal 

intussusception left lower quadrant, probably involving proximal ileum, does 

not appear to obstructive, likely transient.


No acute process otherwise


Patient admitted with diagnoses of abdominal pain, elevated liver enzymes, 

hypokalemia, intractable nausea and  vomiting.


 


CONSULTANTS:


GI specialist Dr. Cannon


surgery Dr. La


pain specialist Dr. Gunderson


 


\[facility] [place] [facility]\"" COURSE: 


Patient admitted to the floor.  


Patient was on IV fluids and kept nothing by mouth.  


Potassium was replaced.  


Surgeon seen and evaluated patient.  


Abdominal pain was possibly related to intussusception  or can be incidental 

finding.  


Small bowel series  revealed no evidence to suggest small bowel obstruction.  


Per surgeon no acute surgical intervention indicated at this time.  


Abdominal exam was benign ,  however patient still experienced  pain.  


Pain management provided as per pain specialist recommendations.


Bowel regimen implemented.  


Patient slowly started on trial of diet.  


Diet slowly advanced as tolerated,  antiemetics provided as needed. 


GI closely followed .  


According to the surgeon,  intussusception likely transient in   young 28 years 

old female with  normal small bowel series.  


Abdominal pain was probably due to constipation, possible IBS. 


Patient was able to tolerate diet.  


Patient had bowel movement.  


Pain resolved.  


GI specialist closely follow.  


LFT were trending down.  


Hepatitis panel was negative.  


Electrolytes were closely monitored  and replaced as needed.  


GI prophylaxis provided.  


Patient was stable for discharge home .


Follow-up with   GI as  outpatient.





FINAL DIAGNOSES: 


Abdominal pain 


Intussusception of small bowel , transient   ( with normal small bowel series) 


Constipation 


Possible  IBS


Transaminitis, improving


Hypokalemia,- resolved





DISCHARGE MEDICATIONS:


See Medication Reconciliation list.





DISCHARGE INSTRUCTIONS:


Patient was discharged home .


Follow-up with a GI specialist as outpatient





I have been assigned to dictate discharge summary for this account. I was not 

involved in the patient's management.











Kendra Post NP Aug 27, 2018 09:36

## 2022-05-02 ENCOUNTER — HOSPITAL ENCOUNTER (EMERGENCY)
Dept: HOSPITAL 87 - ER | Age: 32
Discharge: ANOTHER HOSPITAL | End: 2022-05-02
Payer: COMMERCIAL

## 2022-05-02 VITALS — WEIGHT: 121.25 LBS | BODY MASS INDEX: 22.31 KG/M2 | HEIGHT: 62 IN

## 2022-05-02 VITALS — DIASTOLIC BLOOD PRESSURE: 98 MMHG | SYSTOLIC BLOOD PRESSURE: 146 MMHG

## 2022-05-02 DIAGNOSIS — Z3A.10: ICD-10-CM

## 2022-05-02 DIAGNOSIS — O26.891: ICD-10-CM

## 2022-05-02 DIAGNOSIS — Z20.822: ICD-10-CM

## 2022-05-02 DIAGNOSIS — N39.0: ICD-10-CM

## 2022-05-02 DIAGNOSIS — O23.41: ICD-10-CM

## 2022-05-02 DIAGNOSIS — O21.1: Primary | ICD-10-CM

## 2022-05-02 DIAGNOSIS — R55: ICD-10-CM

## 2022-05-02 LAB
APPEARANCE UR: (no result)
B-HCG SERPL-ACNC: (no result) MIU/ML (ref ?–3)
BASOPHILS NFR BLD AUTO: 0.3 % (ref 0–2)
CHLORIDE SERPL-SCNC: 102 MEQ/L (ref 98–107)
COLOR UR: (no result)
EOSINOPHIL NFR BLD AUTO: 0.5 % (ref 0–5)
ERYTHROCYTE [DISTWIDTH] IN BLOOD BY AUTOMATED COUNT: 15.1 % (ref 11.6–14.6)
HCT VFR BLD AUTO: 35.2 % (ref 36–48)
HGB BLD-MCNC: 12.2 G/DL (ref 12–16)
HGB UR QL STRIP: NEGATIVE
KETONES UR STRIP-MCNC: (no result) MG/DL
LEUKOCYTE ESTERASE UR QL STRIP: (no result)
LYMPHOCYTES NFR BLD AUTO: 27.3 % (ref 20–50)
MCH RBC QN AUTO: 35.2 PG (ref 28–32)
MCV RBC AUTO: 101.7 FL (ref 81–99)
MONOCYTES NFR BLD AUTO: 6.3 % (ref 2–8)
NEUTROPHILS NFR BLD AUTO: 65.6 % (ref 40–76)
NITRITE UR QL STRIP: NEGATIVE
PH UR STRIP: 7 [PH] (ref 4.5–8)
PLATELET # BLD AUTO: 136 X1000/UL (ref 130–400)
PMV BLD AUTO: 7.9 FL (ref 7.4–10.4)
PROT UR QL STRIP: (no result)
RBC # BLD AUTO: 3.46 MILL/UL (ref 4.2–5.4)
SP GR UR STRIP: 1.02 (ref 1–1.03)
UROBILINOGEN UR STRIP-MCNC: 4 E.U./DL (ref 0.2–1)

## 2022-05-02 PROCEDURE — 96375 TX/PRO/DX INJ NEW DRUG ADDON: CPT

## 2022-05-02 PROCEDURE — 96361 HYDRATE IV INFUSION ADD-ON: CPT

## 2022-05-02 PROCEDURE — 96365 THER/PROPH/DIAG IV INF INIT: CPT

## 2022-05-02 PROCEDURE — 87086 URINE CULTURE/COLONY COUNT: CPT

## 2022-05-02 PROCEDURE — 93005 ELECTROCARDIOGRAM TRACING: CPT

## 2022-05-02 PROCEDURE — 36415 COLL VENOUS BLD VENIPUNCTURE: CPT

## 2022-05-02 PROCEDURE — 76817 TRANSVAGINAL US OBSTETRIC: CPT

## 2022-05-02 PROCEDURE — 87426 SARSCOV CORONAVIRUS AG IA: CPT

## 2022-05-02 PROCEDURE — 80053 COMPREHEN METABOLIC PANEL: CPT

## 2022-05-02 PROCEDURE — 84702 CHORIONIC GONADOTROPIN TEST: CPT

## 2022-05-02 PROCEDURE — 83735 ASSAY OF MAGNESIUM: CPT

## 2022-05-02 PROCEDURE — 99285 EMERGENCY DEPT VISIT HI MDM: CPT

## 2022-05-02 PROCEDURE — 86901 BLOOD TYPING SEROLOGIC RH(D): CPT

## 2022-05-02 PROCEDURE — 81003 URINALYSIS AUTO W/O SCOPE: CPT

## 2022-05-02 PROCEDURE — 86900 BLOOD TYPING SEROLOGIC ABO: CPT

## 2022-05-02 PROCEDURE — 85025 COMPLETE CBC W/AUTO DIFF WBC: CPT

## 2022-05-02 PROCEDURE — 76801 OB US < 14 WKS SINGLE FETUS: CPT

## 2022-05-02 PROCEDURE — 86850 RBC ANTIBODY SCREEN: CPT

## 2023-07-12 ENCOUNTER — HOSPITAL ENCOUNTER (OUTPATIENT)
Dept: HOSPITAL 87 - 8 EST A/PP | Age: 33
Setting detail: OBSERVATION
Discharge: HOME | End: 2023-07-12
Attending: OBSTETRICS & GYNECOLOGY | Admitting: OBSTETRICS & GYNECOLOGY
Payer: MEDICAID

## 2023-07-12 VITALS — HEIGHT: 62 IN | BODY MASS INDEX: 30.91 KG/M2 | WEIGHT: 168 LBS

## 2023-07-12 DIAGNOSIS — O26.852: Primary | ICD-10-CM

## 2023-07-12 DIAGNOSIS — O60.03: ICD-10-CM

## 2023-07-12 DIAGNOSIS — Z3A.20: ICD-10-CM

## 2023-07-12 DIAGNOSIS — O36.8120: ICD-10-CM

## 2023-07-12 LAB
APPEARANCE UR: (no result)
COLOR UR: (no result)
HGB UR QL STRIP: NEGATIVE
KETONES UR STRIP-MCNC: NEGATIVE MG/DL
LEUKOCYTE ESTERASE UR QL STRIP: (no result)
NITRITE UR QL STRIP: NEGATIVE
PH UR STRIP: 6.5 [PH] (ref 4.5–8)
PROT UR QL STRIP: NEGATIVE
SP GR UR STRIP: 1.02 (ref 1–1.03)
UROBILINOGEN UR STRIP-MCNC: 2 E.U./DL (ref 0.2–1)

## 2023-07-12 PROCEDURE — G0379 DIRECT REFER HOSPITAL OBSERV: HCPCS

## 2023-07-12 PROCEDURE — 99281 EMR DPT VST MAYX REQ PHY/QHP: CPT

## 2023-07-12 PROCEDURE — 81003 URINALYSIS AUTO W/O SCOPE: CPT

## 2023-07-12 PROCEDURE — 59025 FETAL NON-STRESS TEST: CPT

## 2023-07-12 PROCEDURE — 76805 OB US >/= 14 WKS SNGL FETUS: CPT

## 2023-07-12 PROCEDURE — 96365 THER/PROPH/DIAG IV INF INIT: CPT

## 2024-04-04 ENCOUNTER — HOSPITAL ENCOUNTER (EMERGENCY)
Dept: HOSPITAL 87 - ER | Age: 34
Discharge: HOME | End: 2024-04-04
Payer: MEDICAID

## 2024-04-04 VITALS
TEMPERATURE: 98.7 F | HEART RATE: 89 BPM | DIASTOLIC BLOOD PRESSURE: 116 MMHG | SYSTOLIC BLOOD PRESSURE: 158 MMHG | RESPIRATION RATE: 16 BRPM

## 2024-04-04 VITALS — HEIGHT: 62 IN | BODY MASS INDEX: 33.19 KG/M2 | WEIGHT: 180.34 LBS

## 2024-04-04 VITALS — OXYGEN SATURATION: 97 %

## 2024-04-04 DIAGNOSIS — I10: ICD-10-CM

## 2024-04-04 DIAGNOSIS — G43.909: Primary | ICD-10-CM

## 2024-04-04 DIAGNOSIS — Z90.49: ICD-10-CM

## 2024-04-04 DIAGNOSIS — J45.909: ICD-10-CM

## 2024-04-04 LAB
ALBUMIN SERPL BCP-MCNC: 5 G/DL (ref 3.2–4.8)
ALT SERPL W P-5'-P-CCNC: 19 IU/L (ref 10–49)
APTT PPP: 28.1 SEC (ref 23.4–31)
AST SERPL W P-5'-P-CCNC: 17 IU/L (ref ?–34)
BASOPHILS NFR BLD AUTO: 0.9 % (ref 0–2)
BILIRUB SERPL-MCNC: 0.3 MG/DL (ref 0.1–1)
BUN SERPL-MCNC: 9 MG/DL (ref 9–23)
CALCIUM SERPL-MCNC: 9.9 MG/DL (ref 8.7–10.4)
CARDIAC TROPONIN I PNL SERPL HS: < 4 NG/L (ref 3–34)
CHLORIDE SERPL-SCNC: 106 MEQ/L (ref 98–107)
CO2 SERPL-SCNC: 25 MEQ/L (ref 21–32)
CREAT SERPL-MCNC: 0.6 MG/DL (ref 0.6–1)
EOSINOPHIL NFR BLD AUTO: 2.3 % (ref 0–5)
ERYTHROCYTE [DISTWIDTH] IN BLOOD BY AUTOMATED COUNT: 16.6 % (ref 11.6–14.6)
GLUCOSE SERPL-MCNC: 87 MG/DL (ref 70–105)
HCG SERPL QL: NEGATIVE
HCT VFR BLD AUTO: 43.4 % (ref 36–48)
HGB BLD-MCNC: 14.3 G/DL (ref 12–16)
INR PPP: 0.9
LYMPHOCYTES NFR BLD AUTO: 28.6 % (ref 20–50)
MCH RBC QN AUTO: 29.7 PG (ref 28–32)
MCHC RBC AUTO-ENTMCNC: 32.9 G/DL (ref 31–37)
MCV RBC AUTO: 90.5 FL (ref 81–99)
MONOCYTES NFR BLD AUTO: 5.5 % (ref 2–8)
NEUTROPHILS NFR BLD AUTO: 62.7 % (ref 40–76)
PLATELET # BLD AUTO: 251 X1000/UL (ref 130–400)
PMV BLD AUTO: 7.7 FL (ref 7.4–10.4)
POTASSIUM SERPL-SCNC: 3.8 MEQ/L (ref 3.5–5.1)
PROT SERPL-MCNC: 9 G/DL (ref 6–8.3)
PROTHROMBIN TIME: 10.3 SEC (ref 9.6–11)
RBC # BLD AUTO: 4.8 MILL/UL (ref 4.2–5.4)
SODIUM SERPL-SCNC: 138 MEQ/L (ref 136–145)
WBC # BLD: 6.1 X1000/UL (ref 4.5–11)

## 2024-04-04 PROCEDURE — 36415 COLL VENOUS BLD VENIPUNCTURE: CPT

## 2024-04-04 PROCEDURE — 84484 ASSAY OF TROPONIN QUANT: CPT

## 2024-04-04 PROCEDURE — 85379 FIBRIN DEGRADATION QUANT: CPT

## 2024-04-04 PROCEDURE — 80053 COMPREHEN METABOLIC PANEL: CPT

## 2024-04-04 PROCEDURE — 85025 COMPLETE CBC W/AUTO DIFF WBC: CPT

## 2024-04-04 PROCEDURE — 93005 ELECTROCARDIOGRAM TRACING: CPT

## 2024-04-04 PROCEDURE — 71045 X-RAY EXAM CHEST 1 VIEW: CPT

## 2024-04-04 PROCEDURE — 99285 EMERGENCY DEPT VISIT HI MDM: CPT

## 2024-04-04 PROCEDURE — 84703 CHORIONIC GONADOTROPIN ASSAY: CPT
